# Patient Record
Sex: MALE | Race: WHITE | HISPANIC OR LATINO | Employment: FULL TIME | URBAN - METROPOLITAN AREA
[De-identification: names, ages, dates, MRNs, and addresses within clinical notes are randomized per-mention and may not be internally consistent; named-entity substitution may affect disease eponyms.]

---

## 2019-07-12 ENCOUNTER — OFFICE VISIT (OUTPATIENT)
Dept: FAMILY MEDICINE | Facility: CLINIC | Age: 29
End: 2019-07-12
Payer: COMMERCIAL

## 2019-07-12 VITALS
SYSTOLIC BLOOD PRESSURE: 129 MMHG | RESPIRATION RATE: 14 BRPM | WEIGHT: 157.19 LBS | HEIGHT: 66 IN | BODY MASS INDEX: 25.26 KG/M2 | OXYGEN SATURATION: 99 % | HEART RATE: 52 BPM | DIASTOLIC BLOOD PRESSURE: 80 MMHG | TEMPERATURE: 98 F

## 2019-07-12 DIAGNOSIS — J32.0 CHRONIC MAXILLARY SINUSITIS: ICD-10-CM

## 2019-07-12 DIAGNOSIS — Z00.00 ROUTINE GENERAL MEDICAL EXAMINATION AT A HEALTH CARE FACILITY: Primary | ICD-10-CM

## 2019-07-12 PROCEDURE — 99385 PREV VISIT NEW AGE 18-39: CPT | Performed by: FAMILY MEDICINE

## 2019-07-12 PROCEDURE — 99213 OFFICE O/P EST LOW 20 MIN: CPT | Mod: 25 | Performed by: FAMILY MEDICINE

## 2019-07-12 RX ORDER — IPRATROPIUM BROMIDE 21 UG/1
2 SPRAY, METERED NASAL 3 TIMES DAILY
Qty: 30 ML | Refills: 3 | Status: SHIPPED | OUTPATIENT
Start: 2019-07-12 | End: 2021-07-07

## 2019-07-12 SDOH — HEALTH STABILITY: MENTAL HEALTH: HOW OFTEN DO YOU HAVE A DRINK CONTAINING ALCOHOL?: 2-3 TIMES A WEEK

## 2019-07-12 SDOH — HEALTH STABILITY: MENTAL HEALTH: HOW OFTEN DO YOU HAVE 6 OR MORE DRINKS ON ONE OCCASION?: MONTHLY

## 2019-07-12 SDOH — HEALTH STABILITY: MENTAL HEALTH: HOW MANY STANDARD DRINKS CONTAINING ALCOHOL DO YOU HAVE ON A TYPICAL DAY?: 3 OR 4

## 2019-07-12 ASSESSMENT — MIFFLIN-ST. JEOR: SCORE: 1625.75

## 2019-07-12 ASSESSMENT — PAIN SCALES - GENERAL: PAINLEVEL: NO PAIN (0)

## 2019-07-12 NOTE — PROGRESS NOTES
SUBJECTIVE:   CC: Ralf Andrews is an 28 year old male who presents for preventative health visit.     Healthy Habits:     Getting at least 3 servings of Calcium per day:  Yes    Bi-annual eye exam:  Yes    Dental care twice a year:  NO    Sleep apnea or symptoms of sleep apnea:  Daytime drowsiness    Diet:  Regular (no restrictions)    Frequency of exercise:  4-5 days/week    Duration of exercise:  30-45 minutes    Taking medications regularly:  Yes    Medication side effects:  Not applicable    PHQ-2 Total Score: 0    Additional concerns today:  No      Also having cronic sinus congestion and sinus pressure.    Trend of symptoms: intermittant  Denies These symptoms: fever, ear pain, myalgia  History of: recurrent sinusitis  Nose: mucosal erythema  Sinuses: normal to palpation    Previously has tried fluticasone spray, was not helpful    Most common is allergic but flonase did not help  Could be vasomotor  Trial of atrovent  .Call or return to clinic if these symptoms worsen or fail to improve as anticipated.    Today's PHQ-2 Score:   PHQ-2 ( 1999 Pfizer) 7/12/2019   Q1: Little interest or pleasure in doing things 0   Q2: Feeling down, depressed or hopeless 0   PHQ-2 Score 0   Q1: Little interest or pleasure in doing things Not at all   Q2: Feeling down, depressed or hopeless Not at all   PHQ-2 Score 0       Abuse: Current or Past(Physical, Sexual or Emotional)- No  Do you feel safe in your environment? Yes    Social History     Tobacco Use     Smoking status: Never Smoker     Smokeless tobacco: Never Used   Substance Use Topics     Alcohol use: Yes     Frequency: 2-3 times a week     Drinks per session: 3 or 4     Binge frequency: Monthly     Comment: soc-beer     If you drink alcohol do you typically have >3 drinks per day or >7 drinks per week? No    Alcohol Use 7/12/2019   Prescreen: >3 drinks/day or >7 drinks/week? No   Prescreen: >3 drinks/day or >7 drinks/week? -   No flowsheet data found.    Last PSA: No  results found for: PSA    Reviewed orders with patient. Reviewed health maintenance and updated orders accordingly - Yes  Lab work is in process  Labs reviewed in EPIC  BP Readings from Last 3 Encounters:   07/12/19 129/80    Wt Readings from Last 3 Encounters:   07/12/19 71.3 kg (157 lb 3 oz)                  There is no problem list on file for this patient.    Past Surgical History:   Procedure Laterality Date     HC TOOTH EXTRACTION W/FORCEP  06/2008       Social History     Tobacco Use     Smoking status: Never Smoker     Smokeless tobacco: Never Used   Substance Use Topics     Alcohol use: Yes     Frequency: 2-3 times a week     Drinks per session: 3 or 4     Binge frequency: Monthly     Comment: soc-beer     Family History   Problem Relation Age of Onset     Osteoporosis Mother      Glaucoma Father      Hyperlipidemia Father      Allergies Sister      Diabetes Maternal Grandmother      Cerebrovascular Disease Maternal Grandmother      Heart Defect Paternal Grandmother          Current Outpatient Medications   Medication Sig Dispense Refill     ipratropium (ATROVENT) 0.03 % nasal spray Spray 2 sprays into both nostrils 3 times daily 30 mL 3     No Known Allergies    Reviewed and updated as needed this visit by clinical staff  Tobacco  Allergies  Meds  Med Hx  Surg Hx  Fam Hx  Soc Hx        Reviewed and updated as needed this visit by Provider  Med Hx  Fam Hx            Review of Systems  CONSTITUTIONAL: NEGATIVE for fever, chills, change in weight  INTEGUMENTARY/SKIN: NEGATIVE for worrisome rashes, moles or lesions  EYES: NEGATIVE for vision changes or irritation  ENT: NEGATIVE for ear, mouth and throat problems  RESP: NEGATIVE for significant cough or SOB  CV: NEGATIVE for chest pain, palpitations or peripheral edema  GI: NEGATIVE for nausea, abdominal pain, heartburn, or change in bowel habits   male: negative for dysuria, hematuria, decreased urinary stream, erectile dysfunction, urethral  "discharge  MUSCULOSKELETAL: NEGATIVE for significant arthralgias or myalgia  NEURO: NEGATIVE for weakness, dizziness or paresthesias  PSYCHIATRIC: NEGATIVE for changes in mood or affect    OBJECTIVE:   /80 (BP Location: Right arm, Patient Position: Sitting, Cuff Size: Adult Regular)   Pulse 52   Temp 98  F (36.7  C) (Oral)   Resp 14   Ht 1.676 m (5' 6\")   Wt 71.3 kg (157 lb 3 oz)   SpO2 99%   BMI 25.37 kg/m      Physical Exam    General Appearance: Pleasant, alert, in no acute respiratory distress.  Head Exam: Normal. Normocephalic, atraumatic. No sinus tenderness.  Eye Exam: Normal external eye, conjunctiva, lids. TANMAY.  Ear Exam: Normal auditory canals and external ears. Non-tender.  Left TM-normal. Right TM-normal.  OroPharynx Exam: Dental hygiene adequate. Normal buccal mucosa. Normal pharynx.  Neck Exam: Supple, no masses or enlarged, tender nodes.  Thyroid Exam: No nodules or enlargement or tenderness.  Chest/Respiratory Exam: Normal, comfortable, easy respirations. Chest wall normal. Lungs are clear to auscultation. No wheezing, crackles, or rhonchi.  Cardiovascular Exam: Regular rate and rhythm. No murmur, gallop, or rubs. No pedal edema.  Gastrointestinal Exam: Soft, non-tender, no masses or organomegaly.  Musculoskeletal Exam: Back is non-tender, full ROM of upper and lower extremities.  Skin: no rash, warm and dry.   Neurologic Exam: Nonfocal, no tremor. Normal gait.  Psychiatric Exam: Alert - appropriate, normal affect      ASSESSMENT/PLAN:       ICD-10-CM    1. Routine general medical examination at a health care facility Z00.00    2. Chronic maxillary sinusitis J32.0 ipratropium (ATROVENT) 0.03 % nasal spray       COUNSELING:   Reviewed preventive health counseling, as reflected in patient instructions       Regular exercise       Healthy diet/nutrition    Estimated body mass index is 25.37 kg/m  as calculated from the following:    Height as of this encounter: 1.676 m (5' 6\").    Weight " as of this encounter: 71.3 kg (157 lb 3 oz).          reports that he has never smoked. He has never used smokeless tobacco.      Counseling Resources:  ATP IV Guidelines  Pooled Cohorts Equation Calculator  FRAX Risk Assessment  ICSI Preventive Guidelines  Dietary Guidelines for Americans, 2010  USDA's MyPlate  ASA Prophylaxis  Lung CA Screening    Vivek Cisneros MD  [facility]

## 2019-07-12 NOTE — NURSING NOTE
"Chief Complaint   Patient presents with     Physical     /80 (BP Location: Right arm, Patient Position: Sitting, Cuff Size: Adult Regular)   Pulse 52   Temp 98  F (36.7  C) (Oral)   Resp 14   Ht 1.676 m (5' 6\")   Wt 71.3 kg (157 lb 3 oz)   SpO2 99%   BMI 25.37 kg/m   Estimated body mass index is 25.37 kg/m  as calculated from the following:    Height as of this encounter: 1.676 m (5' 6\").    Weight as of this encounter: 71.3 kg (157 lb 3 oz).  bp completed using cuff size: regular      Health Maintenance addressed:  NONE    N/a  Deborah Garcia CMA on 7/12/2019 at 3:13 PM                "

## 2019-08-09 ENCOUNTER — OFFICE VISIT (OUTPATIENT)
Dept: FAMILY MEDICINE | Facility: CLINIC | Age: 29
End: 2019-08-09
Payer: COMMERCIAL

## 2019-08-09 VITALS
RESPIRATION RATE: 14 BRPM | OXYGEN SATURATION: 99 % | HEIGHT: 66 IN | SYSTOLIC BLOOD PRESSURE: 124 MMHG | WEIGHT: 159.4 LBS | HEART RATE: 62 BPM | BODY MASS INDEX: 25.62 KG/M2 | DIASTOLIC BLOOD PRESSURE: 75 MMHG

## 2019-08-09 DIAGNOSIS — S39.012A STRAIN OF LUMBAR REGION, INITIAL ENCOUNTER: Primary | ICD-10-CM

## 2019-08-09 PROCEDURE — 99213 OFFICE O/P EST LOW 20 MIN: CPT | Performed by: FAMILY MEDICINE

## 2019-08-09 ASSESSMENT — PAIN SCALES - GENERAL: PAINLEVEL: MODERATE PAIN (4)

## 2019-08-09 ASSESSMENT — MIFFLIN-ST. JEOR: SCORE: 1630.78

## 2019-08-09 NOTE — PROGRESS NOTES
Subjective     Ralf Andrews is a 29 year old male who presents to clinic today for the following health issues:    HPI   Back Pain       Duration: 2 weeks        Specific cause: lifting, deadlifts and then squats and then a 3 hour bike ride    Description:   Location of pain: low back bilateral  Character of pain: sharp, dull ache and discomfort  Pain radiation:none  New numbness or weakness in legs, not attributed to pain:  no     Intensity: Currently 3/10    History:   Pain interferes with job: No  History of back problems: no prior back problems  Any previous MRI or X-rays: None  Sees a specialist for back pain:  No  Therapies tried without relief: ibuprfen        Accompanying Signs & Symptoms:  Risk of Fracture:  None  Risk of Cauda Equina:  None  Risk of Infection:  None  Risk of Cancer:  None       ROS: As per HPI.  Skin: no changing lesions, no other concerns  Heme: no abnormal bruising or bleeding problems  Neuro: no significant weakness, numbness, tingling.  Patient denies red flag history elements:  Cancer, Unexplained weight loss,, Fever, Bladder or bowel incontinence, Urinary retention (with overflow incontinence)    Current Outpatient Medications   Medication     ipratropium (ATROVENT) 0.03 % nasal spray     No current facility-administered medications for this visit.      I have reviewed the patient's medical history in detail; there are no changes to the history as noted in EpicCare.  Social History     Socioeconomic History     Marital status: Single     Spouse name: Not on file     Number of children: Not on file     Years of education: Not on file     Highest education level: Not on file   Occupational History     Not on file   Social Needs     Financial resource strain: Not on file     Food insecurity:     Worry: Not on file     Inability: Not on file     Transportation needs:     Medical: Not on file     Non-medical: Not on file   Tobacco Use     Smoking status: Never Smoker     Smokeless  "tobacco: Never Used   Substance and Sexual Activity     Alcohol use: Yes     Frequency: 2-3 times a week     Drinks per session: 3 or 4     Binge frequency: Monthly     Comment: soc-beer     Drug use: Yes     Types: Marijuana     Comment: rarly - less then monthly     Sexual activity: Yes     Partners: Female     Birth control/protection: Condom   Lifestyle     Physical activity:     Days per week: Not on file     Minutes per session: Not on file     Stress: Not on file   Relationships     Social connections:     Talks on phone: Not on file     Gets together: Not on file     Attends Worship service: Not on file     Active member of club or organization: Not on file     Attends meetings of clubs or organizations: Not on file     Relationship status: Not on file     Intimate partner violence:     Fear of current or ex partner: Not on file     Emotionally abused: Not on file     Physically abused: Not on file     Forced sexual activity: Not on file   Other Topics Concern     Not on file   Social History Narrative     Not on file       EXAM  /75 (BP Location: Left arm, Patient Position: Sitting, Cuff Size: Adult Regular)   Pulse 62   Resp 14   Ht 1.676 m (5' 6\")   Wt 72.3 kg (159 lb 6.4 oz)   SpO2 99%   BMI 25.73 kg/m    Gen: Healthy appearing male in no apparent distress  Neck: Supple, no pain  Chest: normal Resp sounds, no chest wall pain  Abd: no masses or tenderness  Groin: No saddle anesthesia  Spine:Lumbosacral spine area reveals no local tenderness or mass. Painful and reduced LS ROM noted. Straight leg raise is negative bilateral.  Extremities: Normal R & L lower extremity joints for ROM symmetry & tone/ no tenderness/ no effusions/ no crepitus or deformities.  Neurological exam reveals normal without focal findings, muscle tone and strength normal and symmetric, reflexes normal and symmetric, sensation grossly normal, gait and station normal    ASSESSMENT/PLAN:    ICD-10-CM    1. Strain of lumbar " region, initial encounter S39.012A      Standard patient information handout given with instructions on home treatment including OTC medications and follow up.  We discussed the possibility of therapy but he does not want to do that at this time    Call or return to clinic as needed if these symptoms worsen or fail to improve as anticipated.    Vivek Cisneros MD MPH    No follow-ups on file.

## 2020-03-11 ENCOUNTER — HEALTH MAINTENANCE LETTER (OUTPATIENT)
Age: 30
End: 2020-03-11

## 2021-01-03 ENCOUNTER — HEALTH MAINTENANCE LETTER (OUTPATIENT)
Age: 31
End: 2021-01-03

## 2021-07-07 ENCOUNTER — OFFICE VISIT (OUTPATIENT)
Dept: BEHAVIORAL HEALTH | Facility: CLINIC | Age: 31
End: 2021-07-07

## 2021-07-07 ENCOUNTER — OFFICE VISIT (OUTPATIENT)
Dept: FAMILY MEDICINE | Facility: CLINIC | Age: 31
End: 2021-07-07
Payer: COMMERCIAL

## 2021-07-07 VITALS
RESPIRATION RATE: 15 BRPM | HEART RATE: 61 BPM | BODY MASS INDEX: 22.46 KG/M2 | WEIGHT: 139.75 LBS | OXYGEN SATURATION: 97 % | TEMPERATURE: 97 F | DIASTOLIC BLOOD PRESSURE: 73 MMHG | SYSTOLIC BLOOD PRESSURE: 118 MMHG | HEIGHT: 66 IN

## 2021-07-07 DIAGNOSIS — L98.9 SKIN LESION: ICD-10-CM

## 2021-07-07 DIAGNOSIS — Z00.00 ANNUAL PHYSICAL EXAM: Primary | ICD-10-CM

## 2021-07-07 DIAGNOSIS — F43.20 ADJUSTMENT DISORDER, UNSPECIFIED TYPE: Primary | ICD-10-CM

## 2021-07-07 DIAGNOSIS — F41.0 ANXIETY ATTACK: ICD-10-CM

## 2021-07-07 LAB
CHOLEST SERPL-MCNC: 211 MG/DL (ref 0–200)
CHOLEST/HDLC SERPL: 2.7 {RATIO} (ref 0–5)
FASTING SPECIMEN: NO
GLUCOSE CASUAL: 102 MG/DL (ref 51–200)
HDLC SERPL-MCNC: 78 MG/DL
LDLC SERPL CALC-MCNC: 117 MG/DL (ref 0–129)
TRIGL SERPL-MCNC: 77 MG/DL (ref 0–150)
TSH SERPL DL<=0.005 MIU/L-ACNC: 2.1 MU/L (ref 0.4–4)
VLDL-CHOLESTEROL: 15 (ref 7–32)

## 2021-07-07 SDOH — HEALTH STABILITY: MENTAL HEALTH: HOW MANY STANDARD DRINKS CONTAINING ALCOHOL DO YOU HAVE ON A TYPICAL DAY?: 1 OR 2

## 2021-07-07 ASSESSMENT — ANXIETY QUESTIONNAIRES
GAD7 TOTAL SCORE: 11
3. WORRYING TOO MUCH ABOUT DIFFERENT THINGS: NEARLY EVERY DAY
IF YOU CHECKED OFF ANY PROBLEMS ON THIS QUESTIONNAIRE, HOW DIFFICULT HAVE THESE PROBLEMS MADE IT FOR YOU TO DO YOUR WORK, TAKE CARE OF THINGS AT HOME, OR GET ALONG WITH OTHER PEOPLE: SOMEWHAT DIFFICULT
6. BECOMING EASILY ANNOYED OR IRRITABLE: SEVERAL DAYS
1. FEELING NERVOUS, ANXIOUS, OR ON EDGE: NEARLY EVERY DAY
2. NOT BEING ABLE TO STOP OR CONTROL WORRYING: MORE THAN HALF THE DAYS
7. FEELING AFRAID AS IF SOMETHING AWFUL MIGHT HAPPEN: SEVERAL DAYS
5. BEING SO RESTLESS THAT IT IS HARD TO SIT STILL: NOT AT ALL

## 2021-07-07 ASSESSMENT — MIFFLIN-ST. JEOR: SCORE: 1538.27

## 2021-07-07 ASSESSMENT — PATIENT HEALTH QUESTIONNAIRE - PHQ9
5. POOR APPETITE OR OVEREATING: SEVERAL DAYS
SUM OF ALL RESPONSES TO PHQ QUESTIONS 1-9: 8

## 2021-07-07 NOTE — PATIENT INSTRUCTIONS
ASSESSMENT/PLAN:    1. Generalized anxiety  -Met with Shawn Ullrich, LICSW briefly. Will schedule follow up   -Check Thyroid  -Discussed minimizing screen time  -Suggested yoga or Karthik Chi for this and his back pain    2. RIGHT sided low back pain  - Obtain a Physioball to work on motion and core stability  - Look into yoga or Karthik Chi to add to running    3. Thumb skin lesion  - Referred to Dermatology    4. For Preventive health  - Taught self-testicular exam  - Lacking record of 3rd Hep B immunization. He will ask parents and former schools for records  - Check Lipids and glucose    Follow up with me as needed.       Tunde Busch MD, MS  [facility] Department of Family Medicine and Community Health

## 2021-07-07 NOTE — NURSING NOTE
"30 year old  Chief Complaint   Patient presents with     Physical     and establish     Back Pain     talk about ongoing back pain flare ups      Derm Problem     growth on right thumb        Blood pressure 118/73, pulse 61, temperature 97  F (36.1  C), temperature source Skin, resp. rate 15, height 1.679 m (5' 6.1\"), weight 63.4 kg (139 lb 12 oz), SpO2 97 %. Body mass index is 22.49 kg/m .  There is no problem list on file for this patient.      Wt Readings from Last 2 Encounters:   07/07/21 63.4 kg (139 lb 12 oz)   08/09/19 72.3 kg (159 lb 6.4 oz)     BP Readings from Last 3 Encounters:   07/07/21 118/73   08/09/19 124/75   07/12/19 129/80         Current Outpatient Medications   Medication     ipratropium (ATROVENT) 0.03 % nasal spray     No current facility-administered medications for this visit.        Social History     Tobacco Use     Smoking status: Never Smoker     Smokeless tobacco: Never Used   Substance Use Topics     Alcohol use: Yes     Frequency: 2-3 times a week     Drinks per session: 1 or 2     Binge frequency: Monthly     Comment: 2-3 drinks 2 x per week     Drug use: Yes     Types: Marijuana     Comment: rarly - less then monthly       Health Maintenance Due   Topic Date Due     ADVANCE CARE PLANNING  Never done     HEPATITIS B IMMUNIZATION (3 of 3 - 3-dose primary series) 04/06/1994     HIV SCREENING  Never done     HEPATITIS C SCREENING  Never done     PREVENTIVE CARE VISIT  07/12/2020       No results found for: PAP      July 7, 2021 9:06 AM    "

## 2021-07-07 NOTE — PROGRESS NOTES
"Ralf Andrews presents to [facility] today to have an annual exam.  This is his first visit to our clinic.    Social  Social History     Social History Narrative    From [place], [place].    College at [employer].     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     Then to [place] to work at [employer].     Then grad school at [employer]    BAck to [place] in June 2021 to work [employer].     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      PMH:   Negative.       He presented with 2 concerns, back pain and a right thumb nodule.  Then during the interview he revealed some anxiety and became a bit tearful when discussing it.  Concerns as follows:  -1. Back Pain  Injured back in Summer 2019 when deadlifting. Developed RIGHT lower back pain. Sought care.   Went to [facility] for 2-3 months and that helped lots.   He continues to have intermittent flare-ups with RIGHT low back pain and lateral RIGHT leg pains to lower leg (not into ankle or foot).    No problems with bowel or bladder.     - 2. RIGHT thumb nodule  Has had for \"at least 10 years\". May be enlarging. Does not affect function.     - 3. Some anxiety over the past month. Attributes it to excessive work. Things are well with his family. His relationship with Arash Harp is good.  He cannot put his finger on what is causing him to feel anxious.    Lifestyle habits and Preventive health issues:     Physical activity - Runs daily, about 6 miles/day.   He picked it up in Jan 2020 [place]     Diet - Healthy. \"Lots of veggies.\"    His sleep is generally good, but not enough lately due to work. Feeling a little burnout.     Alcohol intake involves about 2 nights/week and 2-3 drinks/night    He does not use tobacco products. Occasional THC (about 1/month)      Wears seat belt.--Yes.  Wears bike helmet--Yes. .      MALE ROS  Has sex with women only. Has been with [employer] for the past 1.5 years.   Contraception: I.U.D.   STD concerns: No concerns. They are " monogamous and have both been tested.       Has dental visit this afternoon.      ROS  PHQ-2 Score:     PHQ-2 ( 1999 Pfizer) 7/7/2021 7/12/2019   Q1: Little interest or pleasure in doing things 1 0   Q2: Feeling down, depressed or hopeless 2 0   PHQ-2 Score 3 0   Q1: Little interest or pleasure in doing things - Not at all   Q2: Feeling down, depressed or hopeless - Not at all   PHQ-2 Score - 0       PHQ 7/7/2021   PHQ-9 Total Score 8   Q9: Thoughts of better off dead/self-harm past 2 weeks Not at all     ALLI-7 SCORE 7/7/2021   Total Score 11         CONSTITUTIONAL:NEGATIVE for fever, chills, change in weight  EYES: NEGATIVE for vision changes or irritation  ENT/MOUTH: NEGATIVE for ear, mouth and throat problems  RESP:NEGATIVE for significant cough or SOB  CV: NEGATIVE for chest pain, palpitations, JEFFREY, orthopnea, PND  or peripheral edema  GI: NEGATIVE for nausea, abdominal pain, heartburn, or change in bowel habits  :NEGATIVE for frequency, dysuria, or hematuria  NEURO: NEGATIVE for weakness, dizziness or paresthesias  ENDOCRINE: NEGATIVE for polyuria/dipsia,  temperature intolerance, skin/hair changes  HEME/ALLERGY/IMMUNE: NEGATIVE for bleeding problems        Current Outpatient Medications   Medication Sig Dispense Refill     ipratropium (ATROVENT) 0.03 % nasal spray Spray 2 sprays into both nostrils 3 times daily (Patient not taking: Reported on 8/9/2019) 30 mL 3     No Known Allergies    Health Maintenance   Topic Date Due     ADVANCE CARE PLANNING  Never done     HEPATITIS B IMMUNIZATION (3 of 3 - 3-dose primary series) 04/06/1994     HIV SCREENING  Never done     HEPATITIS C SCREENING  Never done     PREVENTIVE CARE VISIT  07/12/2020     INFLUENZA VACCINE (1) 09/01/2021     DTAP/TDAP/TD IMMUNIZATION (7 - Td) 08/07/2028     PHQ-2  Completed     IPV IMMUNIZATION  Completed     COVID-19 Vaccine  Completed     Pneumococcal Vaccine: Pediatrics (0 to 5 Years) and At-Risk Patients (6 to 64 Years)  Aged Out      "MENINGITIS IMMUNIZATION  Aged Out         There is no problem list on file for this patient.      Past Surgical History:   Procedure Laterality Date     HC TOOTH EXTRACTION W/FORCEP  06/2008       Family History   Problem Relation Age of Onset     Osteoporosis Mother      Breast Cancer Mother      Glaucoma Father      Hyperlipidemia Father      Melanoma Father      Allergies Sister      Diabetes Maternal Grandmother      Cerebrovascular Disease Maternal Grandmother      Heart Defect Paternal Grandmother          Immunizations are as follows:      Immunization History   Administered Date(s) Administered     COVID-19,PF,Pfizer 03/27/2021, 04/20/2021     DTAP (<7y) 1990, 1990, 01/30/1991, 07/29/1992, 11/01/1994     Hep B, Peds or Adolescent 11/16/1993, 02/09/1994     Hib, Unspecified 01/30/1991, 04/15/1991, 06/04/1991, 10/21/1991     MMR 07/19/1991, 11/01/1994     Meningococcal (Menomune ) 07/01/2008     OPV, trivalent, live 1990, 1990, 01/30/1991, 07/29/1992     Tdap (Adacel,Boostrix) 08/07/2018           EXAM  /73 (BP Location: Right arm, Patient Position: Sitting, Cuff Size: Adult Regular)   Pulse 61   Temp 97  F (36.1  C) (Skin)   Resp 15   Ht 1.679 m (5' 6.1\")   Wt 63.4 kg (139 lb 12 oz)   SpO2 97%   BMI 22.49 kg/m    Physically fit and very pleasant 30-year-old who appears in no distress but did become a bit tearful when revealing some new onset of anxiety.    HEENT: His ears were normal and his oropharynx was clear.  Neck was supple. No adenopathy, thyroid normal to palpation  RESP: lungs clear to auscultation bilaterally  Axillae: no palpable axillary masses or adenopathy  CV: regular rate and rhythm, normal S1 S2, no murmur, no carotid bruits  ABDOMEN: soft, nontender, without HSM or masses. Bowel sounds normal  : Normal male genitalia, testes were down.  No masses.  No hernias.  Rectal exam: deferred  MS: Extremities normal- no gross deformities noted, no tender, hot or " swollen joints.  SKIN: no suspicious lesions or rashes  NEURO: Normal strength and tone, sensory exam grossly normal  PSYCH: He reported feeling anxious.  ALLI-7 score at 11.  PHQ-9 score at 8 with no feelings of self-harm.    EXT: no peripheral edema        IMPRESSION  Ralf is a 31 yo making his first visit to our clinic. Leading a healthy lifestyle. Having some anxiety recently. Unknown etiology    ASSESSMENT/PLAN:    1. Generalized anxiety  -Met with Shawn Ullrich, LICSW briefly. Will schedule follow up   -Check Thyroid  -Discussed minimizing screen time  -Suggested yoga or Karthik Chi for this and his back pain    2. RIGHT sided low back pain  - Obtain a Physioball to work on motion and core stability  - Look into yoga or Karthik Chi to add to running    3. Thumb skin lesion  - Referred to Dermatology    4. For Preventive health  - Taught self-testicular exam  - Lacking record of 3rd Hep B immunization. He will ask parents and former schools for records  - Check Lipids and glucose    Follow up with me as needed.       Tunde Busch MD, MS  [facility] Department of Family Medicine and Community Health

## 2021-07-07 NOTE — PROGRESS NOTES
Patient had appointment with his primary care physician. [facility] services were offered. No immediate safety/risk issues were reported or identified.  Explained the role of the [facility] and provided informational handout and contact information for the [facility].       Shawn G. Ullrich, [facility], Behavioral Health Clinician

## 2021-07-08 ASSESSMENT — ANXIETY QUESTIONNAIRES: GAD7 TOTAL SCORE: 11

## 2021-07-21 NOTE — PROGRESS NOTES
"  [facility]  July 22, 2021    Ralf Andrews is a 31 year old male who is being evaluated via a telephone visit.      The patient has been notified of the following:     \"We have found that certain health care needs can be provided without the need for a face to face visit.  This service lets us provide the care you need with a short phone conversation.      I will have full access to your [facility] medical record during this entire phone call.   I will be taking notes for your medical record.     Since this is like an office visit, we will bill your insurance company for this service.  Please check with your medical insurance if this type of telephone visit/virtual care is covered.  You may be responsible for the cost of this service if insurance coverage is denied.      There are potential benefits and risks of telephone visits (e.g. limits to patient confidentiality) that differ from in-person visits.?  Confidentiality still applies for telephone services, and nobody will record the visit.  It is important to be in a quiet, private space that is free of distractions (including cell phone or other devices) during the visit.??     If during the course of the call I believe a telephone visit is not appropriate, you will not be charged for this service\"    Consent has been obtained for this service by care team member: yes.    As the provider I attest to compliance with applicable laws and regulations related to telemedicine.    Behavioral Health Clinician Progress Note    Patient Name: Ralf Andrews           Service Type:  Individual      Service Location:  telehealth     Session Start Time: 3:05 pm  Session End Time: 3:28 pm      Session Length: 16 - 37      Attendees: Client    Visit Activities (Refresh list every visit): [facility] and [facility] Only    Diagnostic Assessment Date: NA; unable to complete during initial appt  Treatment Plan Review Date: unable to complete during initial appt; began to outline " "goals and strategies  CGI Review Date: 10/22/21    Clinical Global Impressions  First:  Considering your total clinical experience with this particular patient population, how severe are the patient's symptoms at this time?: 4 (7/22/2021  3:37 PM)      Most recent:  No data recorded    See Flowsheets for today's PHQ-9 and ALLI-7 results  Previous PHQ-9:   PHQ-9 SCORE 7/7/2021 7/22/2021   PHQ-9 Total Score MyChart - 4 (Minimal depression)   PHQ-9 Total Score 8 4     Previous ALLI-7:   ALLI-7 SCORE 7/7/2021 7/22/2021   Total Score - 6 (mild anxiety)   Total Score 11 6       ZIYAD LEVEL:  ZIYAD Score (Last Two) 7/12/2019   ZIYAD Raw Score 38   Activation Score 83.7   ZIYAD Level 4       DATA  Extended Session (60+ minutes): No  Interactive Complexity: No  Crisis: No  BH Patient: No    Treatment Objective(s) Addressed in This Session:  Target Behavior(s): disease management/lifestyle changes work/personal life management    Anxiety: will experience a reduction in anxiety, will develop more effective coping skills to manage anxiety symptoms and will develop healthy cognitive patterns and beliefs    Current Stressors / Issues:  [facility] unable to complete diagnostic assessment during initial visit.  C assessed for safety, gathered information, established rapport and began developing therapeutic alliance.  Pt denied past current SI/SA/SIB or other safety concerns; denied current alcohol/substance use issues.      Ralf stated, \"A couple weeks ago I was experiencing a lot of burnout only driven by work and it was starting to affect my performance at work and being able to engage with family and friends...My anxiety was spiking in general\".  Ralf reported his work is is primary source of stress, noting several challenges including, \"the work isn't engaging\", \"the professional development isn't there\", decreased quality of work relationship with supervisor,and  increased team challenges.  And the project which he is working on was going " "to end soon, which was the light at the end of the tunnel, has now been extended for 6 weeks.  Ralf denied current/past SI, with no SIB or SA, and no previous mental health services.  Ralf reported no current concerns about alcohol/drug use, maintaining moderate to low level of alcohol use.  Ralf reported having supports, noting he resides with his partner and she has been very supportive.  Ralf reported maintaining healthy diet and runs daily.        Ralf endorsed/reported the following symptoms:  -\"Pain in my chest\"  -\"overthinking\"  -\"sad\"  -Cried at work  -\"restless\" sleep  -excessive worry and   -decreased concentration   -fatigue  -anhedonia  -restlessness  -nervous    Work Stress   -Consult for HealthCare Strategies  -challenges in work team  -the work is a grind   -70 hours per week    SI: None; no past/current \"never had those thoughts\"; no other safety concerns.     History of MH Services: none    SO  -supportive   -resides together    Strategies:  -Ralf wants to \"Say No to more things...I overload myself with commitments\"  -Meditation: has recently used 5 examples sonu and found the sleepcasts to be effective for helping to fall asleep  -Reading  -Run every morning  -Diet: healthy  -Alcohol: 2x's per week/2 drinks per epidsode    Next Appt:  -Meditation  -Breathing  -Brain/Body and Anxiety  -Guided imagery  -PMR  -Anxiety Paradox     Progress on Treatment Objective(s) / Homework:  New Objective established this session - CONTEMPLATION (Considering change and yet undecided); Intervened by assessing the negative and positive thinking (ambivalence) about behavior change    Motivational Interviewing    MI Intervention: Co-Developed Goal: improve mood and decrease anxiety; stress management, Expressed Empathy/Understanding, Supported Autonomy, Collaboration, Evocation, Open-ended questions, Reflections: simple and complex and Change talk (evoked)     Change Talk Expressed by the Patient: Desire to change " Reasons to change Need to change    Provider Response to Change Talk: E - Evoked more info from patient about behavior change and A - Affirmed patient's thoughts, decisions, or attempts at behavior change    Also provided psychoeducation about behavioral health condition, symptoms, and treatment options    Care Plan review completed: Yes    Medication Review:  No current psychiatric medications prescribed    Medication Compliance:  NA    Changes in Health Issues:   None reported    Chemical Use Review:   Substance Use: Chemical use reviewed, no active concerns identified      Tobacco Use: No current tobacco use.      Assessment: Current Emotional / Mental Status (status of significant symptoms):  Risk status (Self / Other harm or suicidal ideation)  Patient denies a history of suicidal ideation, suicide attempts, self-injurious behavior, homicidal ideation, homicidal behavior and and other safety concerns  Patient denies current fears or concerns for personal safety.  Patient denies current or recent suicidal ideation or behaviors.  Patient denies current or recent homicidal ideation or behaviors.  Patient denies current or recent self injurious behavior or ideation.  Patient denies other safety concerns.  A safety and risk management plan has not been developed at this time, however patient was encouraged to call [place] [phone] should there be a change in any of these risk factors.    Appearance:   Unable to assess due to telephone visit   Eye Contact:   NA   Psychomotor Behavior: NA   Attitude:   Cooperative   Orientation:   All  Speech   Rate / Production: Normal/ Responsive Normal    Volume:  Normal   Mood:    Anxious  Sad   Affect:    NA   Thought Content:  Clear   Thought Form:  Coherent  Logical   Insight:    Fair     Diagnoses:  1. Adjustment disorder with mixed anxiety and depressed mood        Collateral Reports Completed:  Routed note to PCP    Plan: (Homework, other):  Patient was given information  "about behavioral services and encouraged to schedule a follow up appointment with the clinic [facility] in 1 week.  He was also given information about mental health symptoms and treatment options .  CD Recommendations: No indications of CD issues.  Shawn Ullrich [facility], [facility]      ______________________________________________________________________    Integrated Primary Care Behavioral Health Treatment Plan    Patient's Name: Ralf Andrews  YOB: 1990    Date: 7/22/21    DSM-V Diagnoses: Adjustment Disorders  309.28 (F43.23) With mixed anxiety and depressed mood  Psychosocial / Contextual Factors:  Pandemic; Increased work stress; partnererd  WHODAS: completed on 7/22/21    Referral / Collaboration:  Referral to another professional/service is not indicated at this time..    Anticipated number of session or this episode of care: 6      MeasurableTreatment Goal(s) related to diagnosis / functional impairment(s)  Goal 1: Patient will develop and implement stress management skills to improve mood and reduce anxiety    Strategies:  -Ralf wants to \"Say No to more things...I overload myself with commitments\"  -Meditation: has recently used [employer] sonu and found the sleepcasts to be effective for helping to fall asleep  -Reading  -Run every morning  -Diet: healthy    Next Appt:  -Meditation  -Breathing  -Brain/Body and Anxiety  -Guided imagery  -PMR  -Anxiety Paradox       Shawn G. Ullrich, [facility]  July 22, 2021        "

## 2021-07-22 ENCOUNTER — VIRTUAL VISIT (OUTPATIENT)
Dept: BEHAVIORAL HEALTH | Facility: CLINIC | Age: 31
End: 2021-07-22
Payer: COMMERCIAL

## 2021-07-22 DIAGNOSIS — F43.23 ADJUSTMENT DISORDER WITH MIXED ANXIETY AND DEPRESSED MOOD: Primary | ICD-10-CM

## 2021-07-22 ASSESSMENT — COLUMBIA-SUICIDE SEVERITY RATING SCALE - C-SSRS
1. IN THE PAST MONTH, HAVE YOU WISHED YOU WERE DEAD OR WISHED YOU COULD GO TO SLEEP AND NOT WAKE UP?: NO
3. HAVE YOU BEEN THINKING ABOUT HOW YOU MIGHT KILL YOURSELF?: NO
1. IN THE PAST MONTH, HAVE YOU WISHED YOU WERE DEAD OR WISHED YOU COULD GO TO SLEEP AND NOT WAKE UP?: NO
TOTAL  NUMBER OF ABORTED OR SELF INTERRUPTED ATTEMPTS PAST LIFETIME: NO
6. HAVE YOU EVER DONE ANYTHING, STARTED TO DO ANYTHING, OR PREPARED TO DO ANYTHING TO END YOUR LIFE?: NO
TOTAL  NUMBER OF INTERRUPTED ATTEMPTS LIFETIME: NO
2. HAVE YOU ACTUALLY HAD ANY THOUGHTS OF KILLING YOURSELF?: NO
TOTAL  NUMBER OF INTERRUPTED ATTEMPTS PAST 3 MONTHS: NO
ATTEMPT PAST THREE MONTHS: NO
4. HAVE YOU HAD THESE THOUGHTS AND HAD SOME INTENTION OF ACTING ON THEM?: NO
4. HAVE YOU HAD THESE THOUGHTS AND HAD SOME INTENTION OF ACTING ON THEM?: NO
6. HAVE YOU EVER DONE ANYTHING, STARTED TO DO ANYTHING, OR PREPARED TO DO ANYTHING TO END YOUR LIFE?: NO
ATTEMPT LIFETIME: NO
5. HAVE YOU STARTED TO WORK OUT OR WORKED OUT THE DETAILS OF HOW TO KILL YOURSELF? DO YOU INTEND TO CARRY OUT THIS PLAN?: NO
2. HAVE YOU ACTUALLY HAD ANY THOUGHTS OF KILLING YOURSELF LIFETIME?: NO
5. HAVE YOU STARTED TO WORK OUT OR WORKED OUT THE DETAILS OF HOW TO KILL YOURSELF? DO YOU INTEND TO CARRY OUT THIS PLAN?: NO
TOTAL  NUMBER OF ABORTED OR SELF INTERRUPTED ATTEMPTS PAST 3 MONTHS: NO

## 2021-07-22 ASSESSMENT — ANXIETY QUESTIONNAIRES
5. BEING SO RESTLESS THAT IT IS HARD TO SIT STILL: NOT AT ALL
7. FEELING AFRAID AS IF SOMETHING AWFUL MIGHT HAPPEN: SEVERAL DAYS
GAD7 TOTAL SCORE: 6
2. NOT BEING ABLE TO STOP OR CONTROL WORRYING: SEVERAL DAYS
1. FEELING NERVOUS, ANXIOUS, OR ON EDGE: MORE THAN HALF THE DAYS
3. WORRYING TOO MUCH ABOUT DIFFERENT THINGS: SEVERAL DAYS
8. IF YOU CHECKED OFF ANY PROBLEMS, HOW DIFFICULT HAVE THESE MADE IT FOR YOU TO DO YOUR WORK, TAKE CARE OF THINGS AT HOME, OR GET ALONG WITH OTHER PEOPLE?: SOMEWHAT DIFFICULT
GAD7 TOTAL SCORE: 6
GAD7 TOTAL SCORE: 6
7. FEELING AFRAID AS IF SOMETHING AWFUL MIGHT HAPPEN: SEVERAL DAYS
6. BECOMING EASILY ANNOYED OR IRRITABLE: NOT AT ALL
4. TROUBLE RELAXING: SEVERAL DAYS

## 2021-07-22 ASSESSMENT — PATIENT HEALTH QUESTIONNAIRE - PHQ9
SUM OF ALL RESPONSES TO PHQ QUESTIONS 1-9: 4
SUM OF ALL RESPONSES TO PHQ QUESTIONS 1-9: 4
10. IF YOU CHECKED OFF ANY PROBLEMS, HOW DIFFICULT HAVE THESE PROBLEMS MADE IT FOR YOU TO DO YOUR WORK, TAKE CARE OF THINGS AT HOME, OR GET ALONG WITH OTHER PEOPLE: SOMEWHAT DIFFICULT

## 2021-07-22 NOTE — Clinical Note
Mulugeta,     Ronnell for the referral; will provide support and stress management skills; follow up appt in one week  Alli

## 2021-07-23 ASSESSMENT — ANXIETY QUESTIONNAIRES: GAD7 TOTAL SCORE: 6

## 2021-07-23 ASSESSMENT — PATIENT HEALTH QUESTIONNAIRE - PHQ9: SUM OF ALL RESPONSES TO PHQ QUESTIONS 1-9: 4

## 2021-10-10 ENCOUNTER — HEALTH MAINTENANCE LETTER (OUTPATIENT)
Age: 31
End: 2021-10-10

## 2022-06-02 NOTE — PROGRESS NOTES
"ASSESSMENT AND PLAN:     (R59.0) Cervical lymphadenopathy  (primary encounter diagnosis)  Comment: Improving right posterior cervical LAD for 2 days associated with mild frontal headache and itching. Likely had exposure to some pathogen but no symptoms to suggest an acute infection. Advised monitoring for resolution over the coming weeks. I asked Ralf to message me with any new symptoms or if lymph nodes do not resolved.   Recommended Benadryl for the next few days at night to help with itching and sleep. To let me know if rash develops or itching doesn't resolve.     Fede Melissa MD   [facility]  06/03/2022, 6:20 PM      SUBJECTIVE:   Ralf is a 31 year old male who presents to clinic today for a return visit.    # Swollen Lymph Nodes  - 2 days ago noted stiffness in neck and headache right sided   - persistent dull headache described as \"caffeine headache\" in right forehead  - has not been severe enough to take medications  - no worsening with position changes    - Later same day felt 3 bumps under skin on right side of neck, tender and swollen which worsened yesterday but has improved sometoday  - Did go camping at [place] last weekend and saw black flies bite him   - had a raised red rash to right neck that he suspected was from a bug bite   - checked for ticks (none)     - Unable to fully fall asleep past 2 nights   - Pruritis all over body intermittently and sensitivity to cloth started 2 days ago (areas not just where bug bite)   - Itchy water eyes and nasal congestion when outside, unchanged recently  - No fevers, chills, cough, chest pain, diarrhea or constipation, sore throat   - no tooth pain    - No recent vaccinations     There is no problem list on file for this patient.    No current outpatient medications on file.     No current facility-administered medications for this visit.     I have reviewed the patient's relevant past medical history.     OBJECTIVE:   BP " 125/78 (BP Location: Right arm, Patient Position: Sitting, Cuff Size: Adult Regular)   Pulse 58   Temp 97.8  F (36.6  C) (Skin)   Resp 12   Wt 62.6 kg (138 lb)   SpO2 98%   BMI 22.21 kg/m      Constitutional: well-appearing, appears stated age  Eyes: conjunctivae without erythema, sclera anicteric.  HEENT: Mild erythema to posterior oropharynx, otherwise normal oral cavity exam. Auditory canals and TMs normal bilateral.   Lymph: one 0.5-1cm diameter mobile tender lymph node right posterior cervical chain, 2 smaller nodes adjacent. No left-sided cervical adenopathy. No occipital or supraclavicular LAD.   Skin: raise red papule to posterior inferior right neck

## 2022-06-03 ENCOUNTER — OFFICE VISIT (OUTPATIENT)
Dept: FAMILY MEDICINE | Facility: CLINIC | Age: 32
End: 2022-06-03
Payer: COMMERCIAL

## 2022-06-03 VITALS
DIASTOLIC BLOOD PRESSURE: 78 MMHG | OXYGEN SATURATION: 98 % | WEIGHT: 138 LBS | BODY MASS INDEX: 22.21 KG/M2 | SYSTOLIC BLOOD PRESSURE: 125 MMHG | TEMPERATURE: 97.8 F | HEART RATE: 58 BPM | RESPIRATION RATE: 12 BRPM

## 2022-06-03 DIAGNOSIS — R59.0 CERVICAL LYMPHADENOPATHY: Primary | ICD-10-CM

## 2022-06-03 NOTE — NURSING NOTE
31 year old  Chief Complaint   Patient presents with     Mass     X 2 days of right side of neck, 3 lumps that are painful to the touch       Blood pressure 125/78, pulse 58, temperature 97.8  F (36.6  C), temperature source Skin, resp. rate 12, weight 62.6 kg (138 lb), SpO2 98 %. Body mass index is 22.21 kg/m .  There is no problem list on file for this patient.      Wt Readings from Last 2 Encounters:   06/03/22 62.6 kg (138 lb)   07/07/21 63.4 kg (139 lb 12 oz)     BP Readings from Last 3 Encounters:   06/03/22 125/78   07/07/21 118/73   08/09/19 124/75         No current outpatient medications on file.     No current facility-administered medications for this visit.       Social History     Tobacco Use     Smoking status: Never Smoker     Smokeless tobacco: Never Used   Substance Use Topics     Alcohol use: Yes     Comment: 2-3 drinks 2 x per week     Drug use: Yes     Types: Marijuana     Comment: rarly - less then monthly       Health Maintenance Due   Topic Date Due     ADVANCE CARE PLANNING  Never done     HEPATITIS B IMMUNIZATION (3 of 3 - 3-dose primary series) 04/06/1994     HEPATITIS C SCREENING  Never done       No results found for: PAP      Dinah 3, 2022 12:58 PM

## 2022-06-03 NOTE — PROGRESS NOTES
"2 days ago noted stiffness in neck and headache right sided    - headache persistent describes as \"caffeine headache\" to the right-frontal head    - dull pain    - no medications   Tenderness and swollen felt 2 bumps under skin, peak tenderness yesterday, feels better today   3rd bump felt smaller and harder   Unable to fully fall asleep past 2 nights     Bug bites near neck camping last [place]   - suspect black flies   - checked for ticks (none)     Pruritis all over body intermittently and sensitivity to cloth started 2 days ago (areas not just where bug bite)   Itchy water eyes and nasal congestion when outside     No fevers, chills, cough, chest pain, diarrhea or constipation, sore throat     No recent vaccinations       one 0.1-1cm diameter mobile lymph node   Mild erythema oropharyngeal     Benadryl at night itching and sleeping   "

## 2022-06-13 ENCOUNTER — OFFICE VISIT (OUTPATIENT)
Dept: FAMILY MEDICINE | Facility: CLINIC | Age: 32
End: 2022-06-13
Payer: COMMERCIAL

## 2022-06-13 VITALS
WEIGHT: 140.12 LBS | DIASTOLIC BLOOD PRESSURE: 76 MMHG | BODY MASS INDEX: 22.52 KG/M2 | OXYGEN SATURATION: 99 % | SYSTOLIC BLOOD PRESSURE: 125 MMHG | TEMPERATURE: 98.2 F | HEART RATE: 48 BPM | HEIGHT: 66 IN

## 2022-06-13 DIAGNOSIS — Z00.00 ANNUAL PHYSICAL EXAM: Primary | ICD-10-CM

## 2022-06-13 DIAGNOSIS — D22.9 NUMEROUS SKIN MOLES: ICD-10-CM

## 2022-06-13 NOTE — PATIENT INSTRUCTIONS
ASSESSMENT/PLAN:    Annual Exam/Preventive Issues   -Leading a very healthy lifestyle. Training for a marathon.   -Had normal Lipids and TSH last year.     -Specific concerns:     1. Some skin spots with skin cancer in family  - Refer to Dermatology    2. We do not have records of 3rd Hep B vaccine.   -Ralf will check with his past pediatrician and/or school records.     -Follow up: 1 year, sooner MARK Busch MD, MS

## 2022-06-13 NOTE — PROGRESS NOTES
"Ralf Andrews presents to [facility] today to have an annual exam.     IMPRESSION  Healthy 31-year-old.  Doing well.    ASSESSMENT/PLAN:    Annual Exam/Preventive Issues   -Leading a very healthy lifestyle. Training for a marathon.   -Had normal Lipids and TSH last year.     -Specific concerns:     1. Some skin spots with skin cancer in family  - Refer to Dermatology    2. We do not have records of 3rd Hep B vaccine.   -Ralf will check with his past pediatrician and/or school records.     -Follow up: 1 year, sooner MARK Busch MD, MS    Introduction: Ralf is here for an annual exam.  He's doing well. Has not contracted Covid.   States that his life is going well.  He greatly enjoys his new job.  He is working for an [employer].  He finds it far less stressful.  He is exercising regularly.  His relationships are going well.    No major concerns although he still is somewhat concerned about skin cancer which runs in his family and he has multiple freckles throughout his arms and body.      Current Medications include:   No current outpatient medications on file.     No Known Allergies      Social  Social History     Social History Narrative    From [place], [place].    College at [employer].     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     Then to [place] to work at [employer].     Then grad school at [employer]    BAck to [facility] in June 2021     Works at \"[employer] an [employer]       Lifestyle habits and Preventive health issues:     Lifestyle habits and Preventive health issues:      Physical activity -   He picked it up in Jan 2020 running in [place] at [facility].  Runs daily, about 6 miles/day.   Just finished a marathon training program.      Diet - Healthy. \"Lots of veggies.\"     His sleep is generally good, but not enough lately due to work. Feeling a little burnout.      Alcohol intake involves about 2 nights/week and 2-3 drinks/night     He does not use tobacco products. " Occasional THC (about 1/month)        Wears seat belt.--Yes.  Wears bike helmet--Yes. .       MALE CAYLA  Has sex with women only. Has been with Arash for the past 2.5 years.   Contraception: I.U.D.   STD concerns: No concerns. They are monogamous and have both been tested.       ROS  PHQ-2 Score:     PHQ-2 ( 1999 Pfizer) 6/13/2022 6/3/2022   Q1: Little interest or pleasure in doing things 0 0   Q2: Feeling down, depressed or hopeless 0 0   PHQ-2 Score 0 0   PHQ-2 Total Score (12-17 Years)- Positive if 3 or more points; Administer PHQ-A if positive - -   Q1: Little interest or pleasure in doing things - -   Q2: Feeling down, depressed or hopeless - -   PHQ-2 Score - -     Intermittent low back pain. With mild intermittent flares.   CONSTITUTIONAL:NEGATIVE for fever, chills, change in weight  INTEGUMENTARY/SKIN: NEGATIVE for worrisome rashes, moles or lesions  EYES: NEGATIVE for vision changes or irritation  ENT/MOUTH: NEGATIVE for ear, mouth and throat problems  RESP:NEGATIVE for significant cough or SOB  CV: NEGATIVE for chest pain, palpitations, JEFFREY, orthopnea, PND  or peripheral edema  GI: NEGATIVE for nausea, abdominal pain, heartburn, or change in bowel habits  :NEGATIVE for frequency, dysuria, or hematuria  NEURO: NEGATIVE for weakness, dizziness or paresthesias  ENDOCRINE: NEGATIVE for polyuria/dipsia,  temperature intolerance, skin/hair changes  HEME/ALLERGY/IMMUNE: NEGATIVE for bleeding problems  PSYCHIATRIC: NEGATIVE for changes in mood or affect        Health Maintenance   Topic Date Due     ADVANCE CARE PLANNING  Never done     HEPATITIS B IMMUNIZATION (3 of 3 - 3-dose primary series) 04/06/1994     HEPATITIS C SCREENING  Never done     PREVENTIVE CARE VISIT  07/07/2022     INFLUENZA VACCINE (Season Ended) 09/01/2022     DTAP/TDAP/TD IMMUNIZATION (7 - Td or Tdap) 08/07/2028     HIV SCREENING  Completed     PHQ-2 (once per calendar year)  Completed     IPV IMMUNIZATION  Completed     COVID-19 Vaccine   "Completed     Pneumococcal Vaccine: Pediatrics (0 to 5 Years) and At-Risk Patients (6 to 64 Years)  Aged Out     MENINGITIS IMMUNIZATION  Aged Out         There is no problem list on file for this patient.      Past Surgical History:   Procedure Laterality Date     HC TOOTH EXTRACTION W/FORCEP  06/2008       Family History   Problem Relation Age of Onset     Osteoporosis Mother      Breast Cancer Mother      Glaucoma Father      Hyperlipidemia Father      Melanoma Father      Allergies Sister      Diabetes Maternal Grandmother      Cerebrovascular Disease Maternal Grandmother      Heart Defect Paternal Grandmother          Immunizations are as follows:      Immunization History   Administered Date(s) Administered     COVID-19,PF,Pfizer (12+ Yrs) 03/27/2021, 04/20/2021, 12/14/2021     DTAP (<7y) 1990, 1990, 01/30/1991, 07/29/1992, 11/01/1994     Hep B, Peds or Adolescent 11/16/1993, 02/09/1994     Hib, Unspecified 01/30/1991, 04/15/1991, 06/04/1991, 10/21/1991     MMR 07/19/1991, 11/01/1994     Meningococcal (Menomune ) 07/01/2008     OPV, trivalent, live 1990, 1990, 01/30/1991, 07/29/1992     Tdap (Adacel,Boostrix) 08/07/2018         EXAM  /76   Pulse (!) 48   Temp 98.2  F (36.8  C)   Ht 1.676 m (5' 5.98\")   Wt 63.6 kg (140 lb 1.9 oz)   SpO2 99%   BMI 22.63 kg/m      GENERAL APPEARANCE: Appears well  HEENT: Neck is supple. No adenopathy, thyroid normal to palpation  RESP: Lungs clear to auscultation bilaterally.  Axillae: no palpable axillary masses or adenopathy  CV: regular rate and rhythm, normal S1 S2, no murmur, no carotid bruits  ABDOMEN: soft, nontender, without HSM or masses. Bowel sounds normal  : Deferred. No concerns.   Rectal exam: deferred  MS: Extremities normal- no gross deformities noted, no tender, hot or swollen joints.    SKIN: Numerous freckles.  No suspicious lesions or rashes  NEURO: Normal strength and tone, sensory exam grossly normal  PSYCH: mentation " appears normal. and affect normal/bright.  EXT: no peripheral edema    SEE TOP OF NOTE FOR ASSESSMENT AND PLAN      Tunde Busch MD, MS  [facility] Department of Family Medicine and Community Health

## 2022-06-13 NOTE — NURSING NOTE
"31 year old  Chief Complaint   Patient presents with     Physical       Blood pressure 125/76, pulse (!) 48, temperature 98.2  F (36.8  C), height 1.676 m (5' 5.98\"), weight 63.6 kg (140 lb 1.9 oz), SpO2 99 %. Body mass index is 22.63 kg/m .  There is no problem list on file for this patient.      Wt Readings from Last 2 Encounters:   06/13/22 63.6 kg (140 lb 1.9 oz)   06/03/22 62.6 kg (138 lb)     BP Readings from Last 3 Encounters:   06/13/22 125/76   06/03/22 125/78   07/07/21 118/73         No current outpatient medications on file.     No current facility-administered medications for this visit.       Social History     Tobacco Use     Smoking status: Never Smoker     Smokeless tobacco: Never Used   Substance Use Topics     Alcohol use: Yes     Comment: 2-3 drinks 2 x per week     Drug use: Yes     Types: Marijuana     Comment: rarly - less then monthly       Health Maintenance Due   Topic Date Due     ADVANCE CARE PLANNING  Never done     HEPATITIS B IMMUNIZATION (3 of 3 - 3-dose primary series) 04/06/1994     HEPATITIS C SCREENING  Never done     PREVENTIVE CARE VISIT  07/07/2022       No results found for: PAP      June 13, 2022 11:13 AM  "

## 2022-07-13 ENCOUNTER — NURSE TRIAGE (OUTPATIENT)
Dept: FAMILY MEDICINE | Facility: CLINIC | Age: 32
End: 2022-07-13

## 2022-07-13 NOTE — TELEPHONE ENCOUNTER
COVID Medication Reconciliation  ~ No Medications    Reason for Disposition    [1] COVID-19 diagnosed by positive lab test (e.g., PCR, rapid self-test kit) AND [2] mild symptoms (e.g., cough, fever, others) AND [3] no complications or SOB    Answer Assessment - Initial Assessment Questions  1. COVID-19 DIAGNOSIS: Positive home COVID-19 antigen test 7/12/22  2. COVID-19 EXPOSURE: Girlfriend's mother tested positive.  They were all in an air B&B together in [place].  3. ONSET: 7/11/22  4. WORST SYMPTOM: Fever  5. COUGH: Productive cough  6. FEVER: 100.6  7. RESPIRATORY STATUS: Breathing normally  8. BETTER-SAME-WORSE: Worse - congestion and cough are worse  9. HIGH RISK DISEASE: No  10. VACCINE: Pfizer 3/27/21, 4/20/21  11. BOOSTER: Pfizer #1 12/14/21  12. PREGNANCY: N/A  13. OTHER SYMPTOMS: Body aches, fatigue, congestion, sore throat, headache  14. O2 SATURATION MONITOR:  No    Protocols used: CORONAVIRUS (COVID-19) DIAGNOSED OR [id number] 1.18.2022    Patient low risk and does not qualify for Paxlovid treatment.  Reviewed isolation requirements, over the counter treatments and testing negative before returning to work/school.  Patient verbalized understanding and will call with further questions, concerns or worsening symptoms.  [facility] progress.  RIGO AlmonteN, RN, [facility]  07/13/22  11:14 AM

## 2022-07-13 NOTE — TELEPHONE ENCOUNTER
Who is calling? Patient  Reason for Call:Wanting to speak with RN about being positive for covid. Wondering what he should be doing and what Sx he should watch for and go to ED    Ariadna

## 2022-09-18 ENCOUNTER — HEALTH MAINTENANCE LETTER (OUTPATIENT)
Age: 32
End: 2022-09-18

## 2023-03-01 NOTE — PROGRESS NOTES
[facility] Dermatology Note  Encounter Date: Mar 2, 2023  Office Visit     Dermatology Problem List:  1. Family hx melanoma (father)  ____________________________________________    Assessment & Plan:    # Family history of melanoma  - Recommended regular skin examinations  - Recommended annual skin exams for first degree family members of father    # Knuckle pads/ Garrod's pads. Minor problem.   - Recommended urea 20% cream OTC  - Recommended avoiding picking    # Benign lesions: Multiple benign nevi, solar lentigines. Explained to patient benign nature of lesion. No treatment is necessary at this time unless the lesion changes or becomes symptomatic.   - ABCDs of melanoma were discussed and self skin checks were advised.  - Sun precaution was advised including the use of sun screens of SPF 30 or higher, sun protective clothing, and avoidance of tanning beds.   - Recommended heliocare supplement    Procedures Performed:   None    Follow-up: 1 year(s) in-person, or earlier for new or changing lesions    Staff and Scribe:     Scribe Disclosure:  I, Avni Shaffer, am serving as a scribe to document services personally performed by See Smallwood MD based on data collection and the provider's statements to me.     Provider Disclosure:   The documentation recorded by the scribe accurately reflects the services I personally performed and the decisions made by me.    See Smallwood MD    Department of Dermatology  [facility]: Phone: [phone], Fax:[phone]  [facility]: Phone: [phone] Fax: [phone]  ____________________________________________    CC: Skin Check (FBSE/History of skin cancer in family)    HPI:  Mr. Ralf Andrews is a(n) 32 year old male who presents today as a new patient for FBSE. Patient reports spot on thumb that he would like examined.  It has been present for many years, and he chews on it. It has been getting larger. The patient otherwise denies any new or concerning lesions. No bleeding, painful, pruritic, or changing lesions. They report no personal history of skin cancer. There is family history of melanoma and BCC (father). No history of immunosuppression. No history of indoor tanning.    Patient is otherwise feeling well, without additional skin concerns.    Labs Reviewed:  N/A    Physical Exam:  Vitals: There were no vitals taken for this visit.  SKIN: Full skin, which includes the head/face, both arms, chest, back, abdomen,both legs, genitalia and/or groin buttocks, digits and/or nails, was examined.  - Multiple regular brown pigmented macules and papules are identified on the trunk and extremities.   - Scattered brown macules on sun exposed areas.  - On the right 1st finger, over DIP joint, well demarcated hyperkeratotic plaque.  - No other lesions of concern on areas examined.     Medications:  No current outpatient medications on file.     No current facility-administered medications for this visit.      Past Medical History:   There is no problem list on file for this patient.    History reviewed. No pertinent past medical history.     CC Tunde Busch MD  [address] [place] [address] on close of this encounter.

## 2023-03-02 ENCOUNTER — OFFICE VISIT (OUTPATIENT)
Dept: DERMATOLOGY | Facility: CLINIC | Age: 33
End: 2023-03-02
Attending: FAMILY MEDICINE
Payer: COMMERCIAL

## 2023-03-02 DIAGNOSIS — M72.1: ICD-10-CM

## 2023-03-02 DIAGNOSIS — Z80.8 FAMILY HX OF MELANOMA: Primary | ICD-10-CM

## 2023-03-02 DIAGNOSIS — L81.4 SOLAR LENTIGO: ICD-10-CM

## 2023-03-02 DIAGNOSIS — D22.9 NUMEROUS SKIN MOLES: ICD-10-CM

## 2023-03-02 DIAGNOSIS — D22.9 MULTIPLE BENIGN NEVI: ICD-10-CM

## 2023-03-02 PROCEDURE — 99203 OFFICE O/P NEW LOW 30 MIN: CPT | Performed by: DERMATOLOGY

## 2023-03-02 ASSESSMENT — PAIN SCALES - GENERAL: PAINLEVEL: NO PAIN (0)

## 2023-03-02 NOTE — LETTER
3/2/2023       RE: Ralf Andrews  [address]  [place] [address]     Dear Colleague,    Thank you for referring your patient, Ralf Andrews, to the [facility]. Please see a copy of my visit note below.    [facility] Dermatology Note  Encounter Date: Mar 2, 2023  Office Visit     Dermatology Problem List:  1. Family hx melanoma (father)  ____________________________________________    Assessment & Plan:    # Family history of melanoma  - Recommended regular skin examinations  - Recommended annual skin exams for first degree family members of father    # Knuckle pads/ Garrod's pads. Minor problem.   - Recommended urea 20% cream OTC  - Recommended avoiding picking    # Benign lesions: Multiple benign nevi, solar lentigines. Explained to patient benign nature of lesion. No treatment is necessary at this time unless the lesion changes or becomes symptomatic.   - ABCDs of melanoma were discussed and self skin checks were advised.  - Sun precaution was advised including the use of sun screens of SPF 30 or higher, sun protective clothing, and avoidance of tanning beds.   - Recommended heliocare supplement    Procedures Performed:   None    Follow-up: 1 year(s) in-person, or earlier for new or changing lesions    Staff and Scribe:     Scribe Disclosure:  I, Avni Shaffer, am serving as a scribe to document services personally performed by See Smallwood MD based on data collection and the provider's statements to me.     Provider Disclosure:   The documentation recorded by the scribe accurately reflects the services I personally performed and the decisions made by me.    See Smallwood MD    Department of Dermatology  [facility]: Phone: [phone], Fax:[phone]  [facility]: Phone: [phone] Fax: [phone]  ____________________________________________    CC: Skin Check (FBSE/History of skin cancer in family)    HPI:  Mr. Ralf Andrews is a(n) 32 year old male who presents today as a new patient for FBSE. Patient reports spot on thumb that he would like examined. It has been present for many years, and he chews on it. It has been getting larger. The patient otherwise denies any new or concerning lesions. No bleeding, painful, pruritic, or changing lesions. They report no personal history of skin cancer. There is family history of melanoma and BCC (father). No history of immunosuppression. No history of indoor tanning.    Patient is otherwise feeling well, without additional skin concerns.    Labs Reviewed:  N/A    Physical Exam:  Vitals: There were no vitals taken for this visit.  SKIN: Full skin, which includes the head/face, both arms, chest, back, abdomen,both legs, genitalia and/or groin buttocks, digits and/or nails, was examined.  - Multiple regular brown pigmented macules and papules are identified on the trunk and extremities.   - Scattered brown macules on sun exposed areas.  - On the right 1st finger, over DIP joint, well demarcated hyperkeratotic plaque.  - No other lesions of concern on areas examined.     Medications:  No current outpatient medications on file.     No current facility-administered medications for this visit.      Past Medical History:   There is no problem list on file for this patient.    History reviewed. No pertinent past medical history.     CC Tunde Busch MD  [address] [place] [address] on close of this encounter.

## 2023-03-02 NOTE — NURSING NOTE
Dermatology Rooming Note    Ralf Andrews's goals for this visit include:   Chief Complaint   Patient presents with     Skin Check     FBSE  History of skin cancer in family     Estrellita Preciado LPN

## 2023-03-02 NOTE — PATIENT INSTRUCTIONS
For knuckle pads:  Can use over-the-counter urea 20% cream twice daily as needed.     For sun protection.   Recommend using over-the-counter Heliocare supplements (available at [employer]) to prevent sunburns.     Patient Education     Checking for Skin Cancer  You can find cancer early by checking your skin each month. There are 3 kinds of skin cancer. They are melanoma, basal cell carcinoma, and squamous cell carcinoma. Doing monthly skin checks is the best way to find new marks or skin changes. Follow the instructions below for checking your skin.   The ABCDEs of checking moles for melanoma   Check your moles or growths for signs of melanoma using ABCDE:   Asymmetry: the sides of the mole or growth don t match  Border: the edges are ragged, notched, or blurred  Color: the color within the mole or growth varies  Diameter: the mole or growth is larger than 6 mm (size of a pencil eraser)  Evolving: the size, shape, or color of the mole or growth is changing (evolving is not shown in the images below)    Checking for other types of skin cancer  Basal cell carcinoma or squamous cell carcinoma have symptoms such as:     A spot or mole that looks different from all other marks on your skin  Changes in how an area feels, such as itching, tenderness, or pain  Changes in the skin's surface, such as oozing, bleeding, or scaliness  A sore that does not heal  New swelling or redness beyond the border of a mole    Who s at risk?  Anyone can get skin cancer. But you are at greater risk if you have:   Fair skin, light-colored hair, or light-colored eyes  Many moles or abnormal moles on your skin  A history of sunburns from sunlight or tanning beds  A family history of skin cancer  A history of exposure to radiation or chemicals  A weakened immune system  If you have had skin cancer in the past, you are at risk for recurring skin cancer.   How to check your skin  Do your monthly skin checkups in front of a  full-length mirror. Check all parts of your body, including your:   Head (ears, face, neck, and scalp)  Torso (front, back, and sides)  Arms (tops, undersides, upper, and lower armpits)  Hands (palms, backs, and fingers, including under the nails)  Buttocks and genitals  Legs (front, back, and sides)  Feet (tops, soles, toes, including under the nails, and between toes)  If you have a lot of moles, take digital photos of them each month. Make sure to take photos both up close and from a distance. These can help you see if any moles change over time.   Most skin changes are not cancer. But if you see any changes in your skin, call your doctor right away. Only he or she can diagnose a problem. If you have skin cancer, seeing your doctor can be the first step toward getting the treatment that could save your life.   [employer] last reviewed this educational content on 4/1/2019 2000-2020 [employer]. [address] [place] [address]. All rights reserved. This information is not intended as a substitute for professional medical care. Always follow your healthcare professional's instructions.       When should I call my doctor?  If you are worsening or not improving, please, contact us or seek urgent care as noted below.     Who should I call with questions (adults)?  [facility] (adult and pediatric): [phone]  [facility] (adult): [phone]  For urgent needs outside of business hours call the [facility] at [phone] and ask for the dermatology resident on call to be paged  If this is a medical emergency and you are unable to reach an ER, Call [phone]    Who should I call with questions (pediatric)?  [facility]- Pediatric Dermatology  Dr. Mckenna Hung, Dr. Boby Markham, Dr. Cyndie Leiva, KODY Way, Dr. Sofi Almanza, Dr. July Gonzalez & Dr. Enrique Reis  Non-urgent nurse triage  line; [phone]- Radha and Stefani RN Care Coordinators   Haley (/Complex ) [phone]    If you need a prescription refill, please contact your pharmacy. Refills are approved or denied by our Physicians during normal business hours, Monday through Fridays  Per office policy, refills will not be granted if you have not been seen within the past year (or sooner depending on your child's condition)    Scheduling Information:  Pediatric Appointment Scheduling and Call Center [phone]  Radiology Scheduling- [phone]  Sedation Unit Scheduling- [phone]  [place] Scheduling- General [phone]; Pediatric Dermatology [phone]  Main  Services: [phone]  Persian: [phone]  Ghanaian: [phone]  Hmong/Macedonian/Qatari: [phone]  Preadmission Nursing Department Fax Number: [phone] (Fax all pre-operative paperwork to this number)    For urgent matters arising during evenings, weekends, or holidays that cannot wait for normal business hours please call [phone] and ask for the dermatology resident on call to be paged.

## 2023-07-07 ENCOUNTER — TELEPHONE (OUTPATIENT)
Dept: DERMATOLOGY | Facility: CLINIC | Age: 33
End: 2023-07-07
Payer: COMMERCIAL

## 2023-07-07 NOTE — TELEPHONE ENCOUNTER
Via phone patient stated he will call to rescheduled the following:    Appointment type: Return  Provider: Dr. Smallwood  Return date: 3-5-24  Specialty phone number: [phone]

## 2023-07-30 ENCOUNTER — HEALTH MAINTENANCE LETTER (OUTPATIENT)
Age: 33
End: 2023-07-30

## 2023-10-30 ENCOUNTER — OFFICE VISIT (OUTPATIENT)
Dept: FAMILY MEDICINE | Facility: CLINIC | Age: 33
End: 2023-10-30
Payer: COMMERCIAL

## 2023-10-30 VITALS
WEIGHT: 145 LBS | SYSTOLIC BLOOD PRESSURE: 105 MMHG | TEMPERATURE: 97.9 F | HEIGHT: 66 IN | HEART RATE: 72 BPM | OXYGEN SATURATION: 96 % | BODY MASS INDEX: 23.3 KG/M2 | RESPIRATION RATE: 17 BRPM | DIASTOLIC BLOOD PRESSURE: 71 MMHG

## 2023-10-30 DIAGNOSIS — Z13.220 SCREENING FOR LIPID DISORDERS: ICD-10-CM

## 2023-10-30 DIAGNOSIS — Z23 NEED FOR VACCINATION: Primary | ICD-10-CM

## 2023-10-30 DIAGNOSIS — Z13.1 SCREENING FOR DIABETES MELLITUS: ICD-10-CM

## 2023-10-30 DIAGNOSIS — Z00.00 ROUTINE GENERAL MEDICAL EXAMINATION AT A HEALTH CARE FACILITY: ICD-10-CM

## 2023-10-30 ASSESSMENT — ENCOUNTER SYMPTOMS
ARTHRALGIAS: 0
FREQUENCY: 0
NAUSEA: 0
PARESTHESIAS: 0
DYSURIA: 0
HEMATURIA: 0
HEADACHES: 0
WEAKNESS: 0
NERVOUS/ANXIOUS: 0
EYE PAIN: 0
HEARTBURN: 0
DIARRHEA: 0
SHORTNESS OF BREATH: 0
DIZZINESS: 0
PALPITATIONS: 0
COUGH: 0
SORE THROAT: 0
FEVER: 0
CHILLS: 0
JOINT SWELLING: 0
HEMATOCHEZIA: 0
MYALGIAS: 0
CONSTIPATION: 0
ABDOMINAL PAIN: 0

## 2023-10-30 NOTE — PROGRESS NOTES
Ralf Andrews presents to [facility] today to have an annual exam.     IMPRESSION  Healthy 32 yo leading a very healthy lifestyle    ASSESSMENT/PLAN:    Annual Exam/Preventive Issues   -Give Covid and Flu vaccine today  -Encouraged continued healthy lifestyle.   - Reviewed his bradycardia. He just finished a heavy training cycle. Has a watch that measures pulse and he's generally in the 50s.       Tunde Busch MD  Family Medicine and Sports Medicine    Introduction: Ralf returns for an annual exam.   Life is going well.  to Arash as of 2022.  Still working at [employer].     No chronic medical problems.   No medications.       There is no problem list on file for this patient.      Current Medications include:   No current outpatient medications on file.     No Known Allergies      Answers submitted by the patient for this visit:  Annual Preventive Visit (Submitted on 10/30/2023)  Chief Complaint: Annual Exam:  Frequency of exercise:: 6-7 days/week  Getting at least 3 servings of Calcium per day:: Yes  Diet:: Regular (no restrictions)  Taking medications regularly:: Yes  Medication side effects:: Not applicable  Bi-annual eye exam:: Yes  Dental care twice a year:: Yes  Sleep apnea or symptoms of sleep apnea:: None  abdominal pain: No  Blood in stool: No  Blood in urine: No  chest pain: No  chills: No  congestion: No  constipation: No  cough: No  diarrhea: No  dizziness: No  ear pain: No  eye pain: No  nervous/anxious: No  fever: No  frequency: No  genital sores: No  headaches: No  hearing loss: No  heartburn: No  arthralgias: No  joint swelling: No  peripheral edema: No  mood changes: No  myalgias: No  nausea: No  dysuria: No  palpitations: No  Skin sensation changes: No  sore throat: No  urgency: No  rash: No  shortness of breath: No  visual disturbance: No  weakness: No  impotence: No  penile discharge: No  Additional concerns today:: No  Exercise outside of work (Submitted on 10/30/2023)  Chief  "Complaint: Annual Exam:  Duration of exercise:: 45-60 minutes. Running and exercise classes.         Social History     Social History Narrative    From [place], [place]. College at [employer].     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     Then to [place] to work at [employer].     Then grad school at [employer]    [place] to [employer] in June 2021     Works at \"[employer] an [employer].     to Arash in 2022.      Social History     Tobacco Use    Smoking status: Never    Smokeless tobacco: Never   Substance Use Topics    Alcohol use: Yes     Comment: 2-3 drinks 2 x per week    Drug use: Yes     Types: Marijuana     Comment: rarly - less then monthly        His sleep is generally good although not normal the past week.   Has a pain in the RIGHT upper back that is annoying him at night. He's in [place]       MALE ROS  Partner: Wife, Arash.   Contraception: IUD  STD concerns: None    ROS  PHQ-2 Score:         3/2/2023    11:42 AM 6/13/2022    11:13 AM   PHQ-2 ( 1999 Pfizer)   Q1: Little interest or pleasure in doing things 0 0   Q2: Feeling down, depressed or hopeless 0 0   PHQ-2 Score 0 0     Major other issues mentioned above. Otherwise, see patient intake questionnaire.   CONSTITUTIONAL:NEGATIVE for fever, chills, change in weight  INTEGUMENTARY/SKIN: NEGATIVE for worrisome rashes, moles or lesions  EYES: NEGATIVE for vision changes or irritation  ENT/MOUTH: NEGATIVE for ear, mouth and throat problems  RESP:NEGATIVE for significant cough or SOB  CV: NEGATIVE for chest pain, palpitations, JEFFREY, orthopnea, PND  or peripheral edema  GI: NEGATIVE for nausea, abdominal pain, heartburn, or change in bowel habits  :NEGATIVE for frequency, dysuria, or hematuria  MUSCULOSKELETAL:NEGATIVE for significant arthralgias or myalgia  NEURO: NEGATIVE for weakness, dizziness or paresthesias  ENDOCRINE: NEGATIVE for polyuria/dipsia,  temperature intolerance, skin/hair changes  HEME/ALLERGY/IMMUNE: NEGATIVE for bleeding " "problems  PSYCHIATRIC: NEGATIVE for changes in mood or affect        Health Maintenance   Topic Date Due    ADVANCE CARE PLANNING  Never done    HEPATITIS B IMMUNIZATION (3 of 3 - 3-dose series) 04/06/1994    HEPATITIS C SCREENING  Never done    YEARLY PREVENTIVE VISIT  06/13/2023    INFLUENZA VACCINE (1) 09/01/2023    COVID-19 Vaccine (5 - 2023-24 season) 09/01/2023    DTAP/TDAP/TD IMMUNIZATION (7 - Td or Tdap) 08/07/2028    HIV SCREENING  Completed    PHQ-2 (once per calendar year)  Completed    IPV IMMUNIZATION  Completed    Pneumococcal Vaccine: Pediatrics (0 to 5 Years) and At-Risk Patients (6 to 64 Years)  Aged Out    HPV IMMUNIZATION  Aged Out    MENINGITIS IMMUNIZATION  Aged Out         Past Surgical History:   Procedure Laterality Date    HC TOOTH EXTRACTION W/FORCEP  06/2008       Family History   Problem Relation Age of Onset    Osteoporosis Mother     Breast Cancer Mother     Glaucoma Father     Hyperlipidemia Father     Melanoma Father     Allergies Sister     Diabetes Maternal Grandmother     Cerebrovascular Disease Maternal Grandmother     Heart Defect Paternal Grandmother          Immunizations are as follows:      Immunization History   Administered Date(s) Administered    COVID-19 Bivalent 12+ (Pfizer) 10/09/2022    COVID-19 MONOVALENT 12+ (Pfizer) 03/27/2021, 04/20/2021, 12/14/2021    DTAP (<7y) 1990, 1990, 01/30/1991, 07/29/1992, 11/01/1994    Hepatitis B, Peds 11/16/1993, 02/09/1994    Hib, Unspecified 01/30/1991, 04/15/1991, 06/04/1991, 10/21/1991    Influenza,INJ,MDCK,PF,Quad >6mo(Flucelvax) 10/09/2022    MMR 07/19/1991, 11/01/1994    Meningococcal (Menomune ) 07/01/2008    OPV, trivalent, live 1990, 1990, 01/30/1991, 07/29/1992    TDAP (Adacel,Boostrix) 08/07/2018         EXAM  /71 (BP Location: Left arm, Patient Position: Sitting, Cuff Size: Adult Regular)   Pulse 72   Temp 97.9  F (36.6  C) (Temporal)   Resp 17   Ht 1.676 m (5' 6\")   Wt 65.8 kg (145 lb)   " SpO2 96%   BMI 23.40 kg/m      GENERAL APPEARANCE: Appears well.   HEENT: Tms normal. Neck is supple. No adenopathy, thyroid normal to palpation  RESP: Lungs clear to auscultation bilaterally.  Axillae: no palpable axillary masses or adenopathy  CV: regular rate and rhythm, normal S1 S2, no murmur, no carotid bruits  ABDOMEN: soft, nontender, without HSM or masses. Bowel sounds normal  : Deferred. Reported no concerns.   Rectal exam: deferred  MS: Gait - Normal. Extremities with generally normal range of motion.   SKIN: No suspicious lesions or rashes  NEURO: Normal strength and tone, sensory exam grossly normal  PSYCH: mentation and affect appear normal.   EXT: No peripheral edema        SEE TOP OF NOTE FOR ASSESSMENT AND PLAN      Tunde Busch MD  Family Medicine and Sports Medicine  [facility] Department of Family Medicine and Community [facility]

## 2023-10-30 NOTE — NURSING NOTE
"33 year old  Chief Complaint   Patient presents with    Physical       Blood pressure 105/71, pulse 72, temperature 97.9  F (36.6  C), temperature source Temporal, resp. rate 17, height 1.676 m (5' 6\"), weight 65.8 kg (145 lb), SpO2 96%. Body mass index is 23.4 kg/m .  There is no problem list on file for this patient.      Wt Readings from Last 2 Encounters:   10/30/23 65.8 kg (145 lb)   06/13/22 63.6 kg (140 lb 1.9 oz)     BP Readings from Last 3 Encounters:   10/30/23 105/71   06/13/22 125/76   06/03/22 125/78         No current outpatient medications on file.     No current facility-administered medications for this visit.       Social History     Tobacco Use    Smoking status: Never    Smokeless tobacco: Never   Substance Use Topics    Alcohol use: Yes     Comment: 2-3 drinks 2 x per week    Drug use: Yes     Types: Marijuana     Comment: rarly - less then monthly       Health Maintenance Due   Topic Date Due    ADVANCE CARE PLANNING  Never done    HEPATITIS B IMMUNIZATION (3 of 3 - 3-dose series) 04/06/1994    HEPATITIS C SCREENING  Never done    YEARLY PREVENTIVE VISIT  06/13/2023    INFLUENZA VACCINE (1) 09/01/2023    COVID-19 Vaccine (5 - 2023-24 season) 09/01/2023       No results found for: \"PAP\"      October 30, 2023 3:08 PM    "

## 2024-06-20 ENCOUNTER — OFFICE VISIT (OUTPATIENT)
Dept: FAMILY MEDICINE | Facility: CLINIC | Age: 34
End: 2024-06-20
Payer: COMMERCIAL

## 2024-06-20 VITALS
RESPIRATION RATE: 16 BRPM | OXYGEN SATURATION: 96 % | WEIGHT: 147.8 LBS | BODY MASS INDEX: 23.86 KG/M2 | DIASTOLIC BLOOD PRESSURE: 72 MMHG | HEART RATE: 57 BPM | TEMPERATURE: 98.1 F | SYSTOLIC BLOOD PRESSURE: 109 MMHG
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
## 2024-06-20 RX ORDER — HYDROXYZINE HYDROCHLORIDE 25 MG/1
25 TABLET, FILM COATED ORAL 3 TIMES DAILY PRN
Qty: 30 TABLET | Refills: 0 | Status: SHIPPED | OUTPATIENT
Start: 2024-06-20

## 2024-06-20 RX ORDER — TRIAMCINOLONE ACETONIDE 1 MG/G
CREAM TOPICAL 2 TIMES DAILY PRN
Qty: 30 G | Refills: 0 | Status: SHIPPED | OUTPATIENT
Start: 2024-06-20

## 2024-06-20 RX ORDER — CETIRIZINE HYDROCHLORIDE 10 MG/1
10 TABLET ORAL 2 TIMES DAILY
Qty: 60 TABLET | Refills: 0 | Status: SHIPPED | OUTPATIENT
Start: 2024-06-20 | End: 2024-07-19

## 2024-06-20 RX ORDER — MUPIROCIN 20 MG/G
OINTMENT TOPICAL 3 TIMES DAILY
Qty: 30 G | Refills: 0 | Status: SHIPPED | OUTPATIENT
Start: 2024-06-20

## 2024-06-20 ASSESSMENT — PAIN SCALES - GENERAL: PAINLEVEL: MODERATE PAIN (4)

## 2024-06-20 NOTE — PATIENT INSTRUCTIONS
START cetirizine (Zyrtec) 10 mg twice daily. Take this until itching, burning and swelling resolve  Can use hydroxyzine 1/2 - 1 tablet at night as needed for itching and sleep.    Use the antibiotic ointment mupirocin 3x/day until clear    Use the steroid ointment triamcinolone 2x/day as needed for itching/burning.

## 2024-06-20 NOTE — PROGRESS NOTES
Assessment & Plan     Ralf was seen today for insect bites.    Diagnoses and all orders for this visit:    Bug bite, initial encounter  -     cetirizine (ZYRTEC) 10 MG tablet; Take 1 tablet (10 mg) by mouth 2 times daily  -     hydrOXYzine HCl (ATARAX) 25 MG tablet; Take 1 tablet (25 mg) by mouth 3 times daily as needed for itching  -     triamcinolone (KENALOG) 0.1 % external cream; Apply topically 2 times daily as needed for irritation  -     mupirocin (BACTROBAN) 2 % external ointment; Apply topically 3 times daily      Appearance is most consistent with bug bite with localized urticarial reaction. Does not appear consistent with Lyme/erythema migrans. Will treat with oral antihistamine and topical steroid. 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 Warning signs and return precautions discussed. Patient is in agreement with plan.      Subjective   Ralf is a 33 year old, presenting for the following health issues:  Insect Bites (Bug bite on right upper arm along with red ring around bite. )    HPI     History of strong reaction to bug bites  Started to get a little itchy yesterday  Then had a red area    Started itching a lot, got woken up in the middle of the night with burning    Today, continued to grow and enlarged  More of a burning sensation now    Was in a wooded area this weekend  No known tick bite, did not remove a tick from this area    No history of Lyme disease      Objective    /72 (BP Location: Left arm, Patient Position: Sitting, Cuff Size: Adult Regular)   Pulse 57   Temp 98.1  F (36.7  C) (Temporal)   Resp 16   Wt 67 kg (147 lb 12.8 oz)   SpO2 96%   BMI 23.86 kg/m    Body mass index is 23.86 kg/m .  Physical Exam   General: Well-appearing in NAD   HEENT: Normocephalic, atraumatic. Mucus membranes moist.   Resp: No respiratory distress   MSK: No peripheral edema.   Skin: There  is a punctate lesion to the right inner forearm with surrounding erythema and warmth. There is a larger urticarial lesion surrounding this.  Psych: Appropriate affect. Mood is good         Signed Electronically by: Carole Dougherty MD
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
## 2024-07-19 RX ORDER — CETIRIZINE HYDROCHLORIDE 10 MG/1
10 TABLET ORAL 2 TIMES DAILY
Qty: 60 TABLET | Refills: 0 | Status: SHIPPED | OUTPATIENT
Start: 2024-07-19

## 2024-07-19 NOTE — TELEPHONE ENCOUNTER
Medication requested: cetirizine (ZYRTEC) 10 MG tablet   Last office visit: 6/20/24  [facility] appointments: none  Medication last refilled: 6/20/24; 60 + 0 refills  Last qualifying labs: N/A    Prescription approved per [facility] Refill Protocol.    See WINKLER, RN  07/19/24 9:16 AM

## 2024-12-01 ENCOUNTER — HEALTH MAINTENANCE LETTER (OUTPATIENT)
Age: 34
End: 2024-12-01

## 2024-12-30 ENCOUNTER — OFFICE VISIT (OUTPATIENT)
Dept: FAMILY MEDICINE | Facility: CLINIC | Age: 34
End: 2024-12-30
Payer: COMMERCIAL

## 2024-12-30 VITALS
SYSTOLIC BLOOD PRESSURE: 121 MMHG | WEIGHT: 151 LBS | DIASTOLIC BLOOD PRESSURE: 69 MMHG | RESPIRATION RATE: 16 BRPM | TEMPERATURE: 98.1 F | OXYGEN SATURATION: 97 % | HEART RATE: 74 BPM | HEIGHT: 66 IN | BODY MASS INDEX: 24.27 KG/M2

## 2024-12-30 DIAGNOSIS — Z23 ENCOUNTER FOR IMMUNIZATION: ICD-10-CM

## 2024-12-30 DIAGNOSIS — Z13.228 SCREENING FOR METABOLIC DISORDER: ICD-10-CM

## 2024-12-30 DIAGNOSIS — Z13.220 SCREENING FOR LIPID DISORDERS: ICD-10-CM

## 2024-12-30 DIAGNOSIS — Z11.59 NEED FOR HEPATITIS C SCREENING TEST: ICD-10-CM

## 2024-12-30 DIAGNOSIS — Z00.00 ANNUAL PHYSICAL EXAM: Primary | ICD-10-CM

## 2024-12-30 LAB
ANION GAP SERPL CALCULATED.3IONS-SCNC: 9 MMOL/L (ref 7–15)
BUN SERPL-MCNC: 13.5 MG/DL (ref 6–20)
CALCIUM SERPL-MCNC: 9.6 MG/DL (ref 8.8–10.4)
CHLORIDE SERPL-SCNC: 104 MMOL/L (ref 98–107)
CHOLEST SERPL-MCNC: 246 MG/DL
CREAT SERPL-MCNC: 0.93 MG/DL (ref 0.67–1.17)
EGFRCR SERPLBLD CKD-EPI 2021: >90 ML/MIN/1.73M2
FASTING STATUS PATIENT QL REPORTED: YES
FASTING STATUS PATIENT QL REPORTED: YES
GLUCOSE SERPL-MCNC: 90 MG/DL (ref 70–99)
HCO3 SERPL-SCNC: 26 MMOL/L (ref 22–29)
HCV AB SERPL QL IA: NONREACTIVE
HDLC SERPL-MCNC: 79 MG/DL
LDLC SERPL CALC-MCNC: 155 MG/DL
NONHDLC SERPL-MCNC: 167 MG/DL
POTASSIUM SERPL-SCNC: 5 MMOL/L (ref 3.4–5.3)
SODIUM SERPL-SCNC: 139 MMOL/L (ref 135–145)
TRIGL SERPL-MCNC: 60 MG/DL

## 2024-12-30 PROCEDURE — 80061 LIPID PANEL: CPT | Mod: ORL | Performed by: FAMILY MEDICINE

## 2024-12-30 PROCEDURE — 80048 BASIC METABOLIC PNL TOTAL CA: CPT | Mod: ORL | Performed by: FAMILY MEDICINE

## 2024-12-30 PROCEDURE — 86803 HEPATITIS C AB TEST: CPT | Mod: ORL | Performed by: FAMILY MEDICINE

## 2024-12-30 SDOH — HEALTH STABILITY: PHYSICAL HEALTH: ON AVERAGE, HOW MANY MINUTES DO YOU ENGAGE IN EXERCISE AT THIS LEVEL?: 40 MIN

## 2024-12-30 SDOH — HEALTH STABILITY: PHYSICAL HEALTH: ON AVERAGE, HOW MANY DAYS PER WEEK DO YOU ENGAGE IN MODERATE TO STRENUOUS EXERCISE (LIKE A BRISK WALK)?: 6 DAYS

## 2024-12-30 ASSESSMENT — PAIN SCALES - GENERAL: PAINLEVEL_OUTOF10: NO PAIN (0)

## 2024-12-30 ASSESSMENT — SOCIAL DETERMINANTS OF HEALTH (SDOH): HOW OFTEN DO YOU GET TOGETHER WITH FRIENDS OR RELATIVES?: ONCE A WEEK

## 2024-12-30 NOTE — PROGRESS NOTES
Prior to immunization administration, verified patients identity using patient s name and date of birth. Please see Immunization Activity for additional information.     Is the patient's temperature normal (100.5 or less)? Yes     Patient MEETS CRITERIA. PROCEED with vaccine administration.      Patient instructed to remain in clinic for 15 minutes afterwards, and to report any adverse reactions.      Link to Ancillary Visit Immunization Standing Orders SmartSet     Screening performed by MARISA Hoover on 12/30/2024 at 9:32 AM.

## 2024-12-30 NOTE — NURSING NOTE
"34 year old    Chief Complaint   Patient presents with    Physical     Bump on right ear, fell sking and is wondering about a bruised rib         Blood pressure 121/69, pulse 74, temperature 98.1  F (36.7  C), temperature source Skin, resp. rate 16, height 1.684 m (5' 6.3\"), weight 68.5 kg (151 lb), SpO2 97%. Body mass index is 24.15 kg/m .    There is no problem list on file for this patient.         Wt Readings from Last 2 Encounters:   12/30/24 68.5 kg (151 lb)   06/20/24 67 kg (147 lb 12.8 oz)       BP Readings from Last 3 Encounters:   12/30/24 121/69   06/20/24 109/72   10/30/23 105/71             Current Outpatient Medications   Medication Sig Dispense Refill    cetirizine (ZYRTEC) 10 MG tablet TAKE 1 TABLET BY MOUTH TWICE A DAY 60 tablet 0    hydrOXYzine HCl (ATARAX) 25 MG tablet Take 1 tablet (25 mg) by mouth 3 times daily as needed for itching 30 tablet 0    mupirocin (BACTROBAN) 2 % external ointment Apply topically 3 times daily 30 g 0    triamcinolone (KENALOG) 0.1 % external cream Apply topically 2 times daily as needed for irritation 30 g 0     No current facility-administered medications for this visit.          Social History     Tobacco Use    Smoking status: Never    Smokeless tobacco: Never   Substance Use Topics    Alcohol use: Yes     Comment: 2-3 drinks 2 x per week    Drug use: Yes     Types: Marijuana     Comment: rarly - less then monthly          Health Maintenance Due   Topic Date Due    ADVANCE CARE PLANNING  Never done    HEPATITIS B IMMUNIZATION (3 of 3 - 3-dose series) 04/06/1994    HEPATITIS C SCREENING  Never done    INFLUENZA VACCINE (1) 09/01/2024    COVID-19 Vaccine (6 - 2024-25 season) 09/01/2024    YEARLY PREVENTIVE VISIT  10/30/2024        No results found for: \"PAP\"        December 30, 2024 8:59 AM        "

## 2024-12-30 NOTE — PROGRESS NOTES
"Preventive Care Visit  [facility]  Tunde Busch MD, Family Medicine    IMPRESSION  Healthy 33 yo who is exercising regularly and feeling well.     ASSESSMENT/PLAN:    Annual Exam/Preventive Issues   - Labs: check lipids and BMP. Also, Hep C per protocol.   - Immunizations: Give Flu vaccine. Check child vaccination records for previous Hep B vaccines  -If the swelling anterior to the RIGHT ear increases or changes, let us know.     Tunde Busch MD  Family Medicine and Sports Medicine      INTRODUCTION:     Ralf is here for an annual preventive exam.   Life is good.   Has done a few long runs including a 51 mile run and a 40 mile run.   Recently bruised a LEFT sided rib about 2 weeks ago. It's improving but still with a dull pain. No shortness of breath.       Current Outpatient Medications   Medication Sig Dispense Refill    cetirizine (ZYRTEC) 10 MG tablet TAKE 1 TABLET BY MOUTH TWICE A DAY 60 tablet 0    hydrOXYzine HCl (ATARAX) 25 MG tablet Take 1 tablet (25 mg) by mouth 3 times daily as needed for itching 30 tablet 0    mupirocin (BACTROBAN) 2 % external ointment Apply topically 3 times daily 30 g 0    triamcinolone (KENALOG) 0.1 % external cream Apply topically 2 times daily as needed for irritation 30 g 0       There is no problem list on file for this patient.      Social History     Social History Narrative    From [place], [place]. College at [employer]. 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     Then to [place] to work at [employer]. Then grad school at [employer]    BAck to [employer] in June 2021.     Works at [id number] an [employer].     to Arash in 2022. They have 2 dogs. No children.            12/30/2024   General Health   How would you rate your overall physical health? Excellent   Feel stress (tense, anxious, or unable to sleep) Only a little   (!) STRESS CONCERN      12/30/2024   Nutrition   Three or more servings of calcium each day? Yes   Diet: Regular (no restrictions) "   How many servings of fruit and vegetables per day? (!) 2-3   How many sweetened beverages each day? 0-1         12/30/2024   Exercise   Days per week of moderate/strenous exercise 6 days   Average minutes spent exercising at this level 40 min         12/30/2024   Social Factors   Frequency of gathering with friends or relatives Once a week   Worry food won't last until get money to buy more No   Food not last or not have enough money for food? No   Do you have housing? (Housing is defined as stable permanent housing and does not include staying ouside in a car, in a tent, in an abandoned building, in an overnight shelter, or couch-surfing.) Yes   Are you worried about losing your housing? No   Lack of transportation? No   Unable to get utilities (heat,electricity)? No         12/30/2024   Dental   Dentist two times every year? Yes         12/30/2024   TB Screening   Were you born outside of the [place]? No         Today's PHQ-2 Score:       12/30/2024     8:53 AM   PHQ-2 ( 1999 [employer])   Q1: Little interest or pleasure in doing things 0   Q2: Feeling down, depressed or hopeless 0   PHQ-2 Score 0    Q1: Little interest or pleasure in doing things Not at all   Q2: Feeling down, depressed or hopeless Not at all   PHQ-2 Score 0       Patient-reported           12/30/2024   Substance Use   Alcohol more than 3/day or more than 7/wk No   Do you use any other substances recreationally? No     Social History     Tobacco Use    Smoking status: Never    Smokeless tobacco: Never   Substance Use Topics    Alcohol use: Yes     Comment: 2-3 drinks 2 x per week    Drug use: Yes     Types: Marijuana     Comment: rarly - less then monthly           12/30/2024   STI Screening   New sexual partner(s) since last STI/HIV test? No         12/30/2024   Contraception/Family Planning   Questions about contraception or family planning No        Reviewed and updated as needed this visit by Provider   Tobacco  Allergies  Meds  Problems  Med Hx  " Surg Hx  Fam Hx              Objective    Exam  /69 (BP Location: Left arm, Patient Position: Sitting, Cuff Size: Adult Regular)   Pulse 74   Temp 98.1  F (36.7  C) (Skin)   Resp 16   Ht 1.684 m (5' 6.3\")   Wt 68.5 kg (151 lb)   SpO2 97%   BMI 24.15 kg/m     Estimated body mass index is 24.15 kg/m  as calculated from the following:    Height as of this encounter: 1.684 m (5' 6.3\").    Weight as of this encounter: 68.5 kg (151 lb).    Physical Exam  GENERAL: Appears well.   Eyes: grossly normal to inspection, PERRL and conjunctivae and sclerae normal  HENT: Small bump over superior mandible/temple region of RIGHT ear area. Not in lymphnode chain region. Has been present for a few days.   ear canals and TM's normal, nose and mouth without ulcers or lesions  NECK: no adenopathy, no asymmetry, masses, or scars  RESP: lungs clear to auscultation - no rales, rhonchi or wheezes  CV: regular rate and rhythm, normal S1 S2, no S3 or S4, no murmur, click or rub, no peripheral edema  ABDOMEN: soft, nontender, no hepatosplenomegaly, no masses and bowel sounds normal  MS: no gross musculoskeletal defects noted, no edema  SKIN: no suspicious lesions or rashes  NEURO: Normal strength and tone, mentation intact and speech normal  PSYCH: mentation appears normal, affect normal/bright        Signed Electronically by: Tunde Busch MD    "

## 2024-12-30 NOTE — PATIENT INSTRUCTIONS
IMPRESSION  Healthy 35 yo who is exercising regularly and feeling well.     ASSESSMENT/PLAN:    Annual Exam/Preventive Issues   - Labs: check lipids and BMP. Also, Hep C per protocol.   - Immunizations: Give Flu vaccine. Check child vaccination records for previous Hep B vaccines      Tunde Busch MD  Family Medicine and Sports Medicine

## 2025-01-21 ENCOUNTER — ANCILLARY PROCEDURE (OUTPATIENT)
Dept: GENERAL RADIOLOGY | Facility: CLINIC | Age: 35
End: 2025-01-21
Attending: PHYSICIAN ASSISTANT
Payer: COMMERCIAL

## 2025-01-21 ENCOUNTER — OFFICE VISIT (OUTPATIENT)
Dept: URGENT CARE | Facility: URGENT CARE | Age: 35
End: 2025-01-21
Payer: COMMERCIAL

## 2025-01-21 VITALS
WEIGHT: 149 LBS | OXYGEN SATURATION: 96 % | RESPIRATION RATE: 17 BRPM | BODY MASS INDEX: 23.95 KG/M2 | TEMPERATURE: 99.6 F | SYSTOLIC BLOOD PRESSURE: 137 MMHG | DIASTOLIC BLOOD PRESSURE: 86 MMHG | HEIGHT: 66 IN | HEART RATE: 71 BPM

## 2025-01-21 DIAGNOSIS — R05.9 COUGH, UNSPECIFIED TYPE: ICD-10-CM

## 2025-01-21 DIAGNOSIS — R09.89 RHONCHI AT BOTH LUNG BASES: ICD-10-CM

## 2025-01-21 DIAGNOSIS — R50.9 FEVER, UNSPECIFIED FEVER CAUSE: ICD-10-CM

## 2025-01-21 DIAGNOSIS — R06.2 WHEEZING: ICD-10-CM

## 2025-01-21 DIAGNOSIS — R09.89 CHEST CONGESTION: Primary | ICD-10-CM

## 2025-01-21 PROCEDURE — 99214 OFFICE O/P EST MOD 30 MIN: CPT | Performed by: PHYSICIAN ASSISTANT

## 2025-01-21 PROCEDURE — 71046 X-RAY EXAM CHEST 2 VIEWS: CPT | Mod: TC | Performed by: RADIOLOGY

## 2025-01-21 RX ORDER — AMOXICILLIN 875 MG/1
875 TABLET, COATED ORAL 2 TIMES DAILY
Qty: 20 TABLET | Refills: 0 | Status: SHIPPED | OUTPATIENT
Start: 2025-01-21 | End: 2025-01-31

## 2025-01-21 RX ORDER — ALBUTEROL SULFATE 90 UG/1
2 INHALANT RESPIRATORY (INHALATION) EVERY 6 HOURS
Qty: 8.5 G | Refills: 0 | Status: SHIPPED | OUTPATIENT
Start: 2025-01-21

## 2025-01-21 RX ORDER — BENZONATATE 200 MG/1
200 CAPSULE ORAL 3 TIMES DAILY PRN
Qty: 30 CAPSULE | Refills: 0 | Status: SHIPPED | OUTPATIENT
Start: 2025-01-21

## 2025-01-21 RX ORDER — AZITHROMYCIN 250 MG/1
TABLET, FILM COATED ORAL
Qty: 6 TABLET | Refills: 0 | Status: SHIPPED | OUTPATIENT
Start: 2025-01-21 | End: 2025-01-26

## 2025-01-21 RX ORDER — PREDNISONE 20 MG/1
20 TABLET ORAL 2 TIMES DAILY
Qty: 10 TABLET | Refills: 0 | Status: SHIPPED | OUTPATIENT
Start: 2025-01-21

## 2025-01-21 NOTE — PROGRESS NOTES
Assessment & Plan     Chest congestion    There is concern for pneumonia based on exam  Will treat with medications  - azithromycin (ZITHROMAX) 250 MG tablet  Dispense: 6 tablet; Refill: 0  - amoxicillin (AMOXIL) 875 MG tablet  Dispense: 20 tablet; Refill: 0    Rhonchi at both lung bases    Pneumonia is an infection of the lungs. Most cases are caused by infections from bacteria or viruses.  Pneumonia may be mild or very severe. If it is caused by bacteria, you will be treated with antibiotics. It may take a few weeks to a few months to recover fully from pneumonia, depending on how sick you were and whether your overall health is good    - XR Chest 2 Views  - azithromycin (ZITHROMAX) 250 MG tablet  Dispense: 6 tablet; Refill: 0  - amoxicillin (AMOXIL) 875 MG tablet  Dispense: 20 tablet; Refill: 0    Wheezing    Albuterol and prednisone for wheezing and bronchospasms  - XR Chest 2 Views  - predniSONE (DELTASONE) 20 MG tablet  Dispense: 10 tablet; Refill: 0  - albuterol (PROVENTIL HFA) 108 (90 Base) MCG/ACT inhaler  Dispense: 8.5 g; Refill: 0    Fever, unspecified fever cause    A fever is a high body temperature and is the body's reaction to an illness. It's one way your body fights illness. A temperature of up to 102 F can be helpful, because it helps the body respond to infection. Most healthy people can have a fever as high as 103 F to 104 F for short periods of time without problems.  Its important to stay well hydrated with a fever and avoid being in the heat.  A fever can be treated with medications provided, but If symptoms worsen then be seen by your PCP or go to the .       Cough, unspecified type    Tessalon for coughing  - benzonatate (TESSALON) 200 MG capsule  Dispense: 30 capsule; Refill: 0         No follow-ups on file.    Miguel Angel Del Rio, [facility], PA-C  [facility]     Ralf is a 34 year old male who presents to clinic today for the following health  "issues:  Chief Complaint   Patient presents with    Cough     X4 days of a Cough, rattling     Urgent Care    Fever     Low grade fever        HPI  Review of Systems  Constitutional, HEENT, cardiovascular, pulmonary, GI, , musculoskeletal, neuro, skin, endocrine and psych systems are negative, except as otherwise noted.      Objective    /86   Pulse 71   Temp 99.6  F (37.6  C) (Temporal)   Resp 17   Ht 1.676 m (5' 6\")   Wt 67.6 kg (149 lb)   SpO2 96%   BMI 24.05 kg/m    Physical Exam   GENERAL: alert and no distress  EYES: Eyes grossly normal to inspection, PERRL and conjunctivae and sclerae normal  HENT: ear canals and TM's normal, nose and mouth without ulcers or lesions  NECK: no adenopathy, no asymmetry, masses, or scars  RESP: rhonchi bilateral and expiratory wheezes bilateral  CV: regular rate and rhythm, normal S1 S2, no S3 or S4, no murmur, click or rub, no peripheral edema  MS: no gross musculoskeletal defects noted, no edema  SKIN: no suspicious lesions or rashes  NEURO: Normal strength and tone, mentation intact and speech normal  PSYCH: mentation appears normal, affect normal/bright    CXR - Reviewed and interpreted by me pos for mild infiltrated bilateral lower lungs Miguel Angel Del Rio, [facility] PA-C        "